# Patient Record
Sex: MALE | Race: BLACK OR AFRICAN AMERICAN | NOT HISPANIC OR LATINO | Employment: STUDENT | URBAN - METROPOLITAN AREA
[De-identification: names, ages, dates, MRNs, and addresses within clinical notes are randomized per-mention and may not be internally consistent; named-entity substitution may affect disease eponyms.]

---

## 2017-06-27 ENCOUNTER — HOSPITAL ENCOUNTER (EMERGENCY)
Facility: HOSPITAL | Age: 22
Discharge: HOME | End: 2017-06-27
Attending: EMERGENCY MEDICINE
Payer: COMMERCIAL

## 2017-06-27 VITALS
HEIGHT: 74 IN | WEIGHT: 233 LBS | SYSTOLIC BLOOD PRESSURE: 132 MMHG | DIASTOLIC BLOOD PRESSURE: 75 MMHG | HEART RATE: 98 BPM | BODY MASS INDEX: 29.9 KG/M2 | TEMPERATURE: 99 F | RESPIRATION RATE: 16 BRPM | OXYGEN SATURATION: 95 %

## 2017-06-27 DIAGNOSIS — H10.32 ACUTE CONJUNCTIVITIS OF LEFT EYE, UNSPECIFIED ACUTE CONJUNCTIVITIS TYPE: Primary | ICD-10-CM

## 2017-06-27 PROCEDURE — 99283 EMERGENCY DEPT VISIT LOW MDM: CPT

## 2017-06-27 RX ORDER — ERYTHROMYCIN 5 MG/G
OINTMENT OPHTHALMIC
Qty: 1 TUBE | Refills: 0 | Status: SHIPPED | OUTPATIENT
Start: 2017-06-27 | End: 2017-06-28 | Stop reason: ALTCHOICE

## 2017-06-27 RX ORDER — GENTAMICIN SULFATE 3 MG/ML
2 SOLUTION/ DROPS OPHTHALMIC 4 TIMES DAILY
Qty: 5 ML | Refills: 0 | Status: SHIPPED | OUTPATIENT
Start: 2017-06-27 | End: 2017-06-28 | Stop reason: ALTCHOICE

## 2017-06-27 NOTE — ED PROVIDER NOTES
Encounter Date: 6/27/2017       History     Chief Complaint   Patient presents with    Eye Problem     Patient presents to the ED with reports of having left eye redness with swelling to both the upper and lower eye lid. States symptoms started x 3 days ago.      Patient is a 21-year-old male who complains of eye redness, discharge and swelling of his eyelids of his left eye.  The symptoms started 3 days ago.  He has no visual changes.  He does not wear contact lens.  No trauma to the eye.  Patient has crusting on his eyelashes upon awakening in the morning.  He says other family members have been diagnosed with pinkeye.      The history is provided by the patient.     Review of patient's allergies indicates:  No Known Allergies  History reviewed. No pertinent past medical history.  History reviewed. No pertinent surgical history.  Family History   Problem Relation Age of Onset    Mental illness Maternal Uncle      schizophrenia    Cancer Maternal Grandmother      colon    Diabetes Maternal Grandmother      adult    Hyperlipidemia Maternal Grandmother     Hypertension Maternal Grandmother     Asthma Neg Hx     Birth defects Neg Hx     Chromosomal disorder Neg Hx     Early death Neg Hx     Heart disease Neg Hx     Seizures Neg Hx     Thyroid disease Neg Hx     Other Neg Hx      Social History   Substance Use Topics    Smoking status: Never Smoker    Smokeless tobacco: Never Used    Alcohol use No     Review of Systems   Eyes: Positive for pain, discharge and redness. Negative for photophobia and visual disturbance.   Gastrointestinal: Negative for nausea and vomiting.   Neurological: Negative for headaches.   All other systems reviewed and are negative.      Physical Exam     Initial Vitals [06/27/17 0058]   BP Pulse Resp Temp SpO2   132/75 98 16 98.7 °F (37.1 °C) 95 %      MAP       94         Physical Exam    Nursing note and vitals reviewed.  Constitutional: No distress.   HENT:   Head:  Normocephalic.   Eyes: EOM are normal. Pupils are equal, round, and reactive to light.   Severe left conjunctival injection with scant purulent discharge.  Mild edema of the upper and lower left eyelids.   Neck: Normal range of motion. Neck supple.   Cardiovascular: Normal rate, regular rhythm and normal heart sounds.   Pulmonary/Chest: Breath sounds normal.   Neurological: He is alert and oriented to person, place, and time.   Skin: Skin is warm and dry.   Psychiatric: His behavior is normal. Thought content normal.         ED Course   Procedures  Labs Reviewed - No data to display          Medical Decision Making:   ED Management:  21-year-old male with left-sided conjunctivitis.  He'll be placed on erythromycin ointment.  I have suggested he follow up with his doctor soon as able for recheck and further treatment as warranted.                   ED Course     Clinical Impression:   The encounter diagnosis was Acute conjunctivitis of left eye, unspecified acute conjunctivitis type.                           Brice Mayes MD  06/27/17 5916

## 2017-06-28 ENCOUNTER — HOSPITAL ENCOUNTER (EMERGENCY)
Facility: HOSPITAL | Age: 22
Discharge: HOME | End: 2017-06-28
Attending: EMERGENCY MEDICINE
Payer: COMMERCIAL

## 2017-06-28 VITALS
BODY MASS INDEX: 29.9 KG/M2 | WEIGHT: 233 LBS | SYSTOLIC BLOOD PRESSURE: 123 MMHG | HEIGHT: 74 IN | RESPIRATION RATE: 18 BRPM | DIASTOLIC BLOOD PRESSURE: 52 MMHG | OXYGEN SATURATION: 98 % | HEART RATE: 86 BPM | TEMPERATURE: 98 F

## 2017-06-28 DIAGNOSIS — H00.036 CELLULITIS OF EYELID, LEFT: Primary | ICD-10-CM

## 2017-06-28 DIAGNOSIS — H10.503 BLEPHAROCONJUNCTIVITIS OF BOTH EYES, UNSPECIFIED BLEPHAROCONJUNCTIVITIS TYPE: ICD-10-CM

## 2017-06-28 LAB
ALBUMIN SERPL BCP-MCNC: 4.2 G/DL
ALP SERPL-CCNC: 52 U/L
ALT SERPL W/O P-5'-P-CCNC: 24 U/L
ANION GAP SERPL CALC-SCNC: 8 MMOL/L
AST SERPL-CCNC: 22 U/L
BASOPHILS # BLD AUTO: 0.01 K/UL
BASOPHILS NFR BLD: 0.3 %
BILIRUB SERPL-MCNC: 0.6 MG/DL
BUN SERPL-MCNC: 12 MG/DL
CALCIUM SERPL-MCNC: 9.6 MG/DL
CHLORIDE SERPL-SCNC: 106 MMOL/L
CO2 SERPL-SCNC: 27 MMOL/L
CREAT SERPL-MCNC: 1.1 MG/DL
DIFFERENTIAL METHOD: ABNORMAL
EOSINOPHIL # BLD AUTO: 0 K/UL
EOSINOPHIL NFR BLD: 0.9 %
ERYTHROCYTE [DISTWIDTH] IN BLOOD BY AUTOMATED COUNT: 12.1 %
EST. GFR  (AFRICAN AMERICAN): >60 ML/MIN/1.73 M^2
EST. GFR  (NON AFRICAN AMERICAN): >60 ML/MIN/1.73 M^2
GLUCOSE SERPL-MCNC: 86 MG/DL
HCT VFR BLD AUTO: 48.3 %
HGB BLD-MCNC: 16 G/DL
LYMPHOCYTES # BLD AUTO: 1.5 K/UL
LYMPHOCYTES NFR BLD: 42.3 %
MCH RBC QN AUTO: 29 PG
MCHC RBC AUTO-ENTMCNC: 33.1 %
MCV RBC AUTO: 88 FL
MONOCYTES # BLD AUTO: 0.6 K/UL
MONOCYTES NFR BLD: 18.3 %
NEUTROPHILS # BLD AUTO: 1.3 K/UL
NEUTROPHILS NFR BLD: 37.9 %
PLATELET # BLD AUTO: 262 K/UL
PMV BLD AUTO: 8.7 FL
POTASSIUM SERPL-SCNC: 4.2 MMOL/L
PROT SERPL-MCNC: 7.9 G/DL
RBC # BLD AUTO: 5.51 M/UL
SODIUM SERPL-SCNC: 141 MMOL/L
WBC # BLD AUTO: 3.45 K/UL

## 2017-06-28 PROCEDURE — 80053 COMPREHEN METABOLIC PANEL: CPT

## 2017-06-28 PROCEDURE — 85025 COMPLETE CBC W/AUTO DIFF WBC: CPT

## 2017-06-28 PROCEDURE — 25000003 PHARM REV CODE 250: Performed by: EMERGENCY MEDICINE

## 2017-06-28 PROCEDURE — 96365 THER/PROPH/DIAG IV INF INIT: CPT

## 2017-06-28 PROCEDURE — 99283 EMERGENCY DEPT VISIT LOW MDM: CPT | Mod: 25

## 2017-06-28 RX ORDER — CLINDAMYCIN HYDROCHLORIDE 150 MG/1
300 CAPSULE ORAL 4 TIMES DAILY
Qty: 56 CAPSULE | Refills: 0 | Status: SHIPPED | OUTPATIENT
Start: 2017-06-28 | End: 2017-07-05

## 2017-06-28 RX ORDER — CLINDAMYCIN PHOSPHATE 900 MG/50ML
900 INJECTION, SOLUTION INTRAVENOUS
Status: COMPLETED | OUTPATIENT
Start: 2017-06-28 | End: 2017-06-28

## 2017-06-28 RX ADMIN — CLINDAMYCIN IN 5 PERCENT DEXTROSE 900 MG: 18 INJECTION, SOLUTION INTRAVENOUS at 03:06

## 2017-06-28 RX ADMIN — CIPROFLOXACIN HYDROCHLORIDE: 3 OINTMENT OPHTHALMIC at 06:06

## 2017-06-28 NOTE — ED NOTES
Patient has verified the spelling of their name and  on armband  LOC: The patient is awake, alert, and aware of environment with an appropriate affect, the patient is oriented x 4 and speaking appropriately.   APPEARANCE: Patient resting comfortably and in no acute distress, patient is clean and well groomed, patient's clothing is properly fastened.   SKIN: The skin is warm and dry, color consistent with ethnicity, patient has normal skin turgor and moist mucus membranes, skin intact, no breakdown or bruising noted, pt states he had pink eye on Saturday and was sent home with medicine and he has been using medicine but it has not gotten better, pt left eye watery, pink, and burning, pt right eye watery at this time.   : Voids without difficulty  MUSCULOSKELETAL: Patient moving all extremities spontaneously, no obvious swelling or deformities noted.   RESPIRATORY: Airway is open and patent, respirations are spontaneous, patient has a normal effort and rate, no accessory muscle use noted, bilateral breath sounds clear, denies SOB   ABDOMEN: Soft and non tender to palpation, no distention noted, normoactive bowel sounds present in all four quadrants.   CARDIAC: Normal rate and rhythm, no peripheral edema noted, less then 3 second capillary refill, denies chest pain

## 2017-06-28 NOTE — ED PROVIDER NOTES
Encounter Date: 6/28/2017       History     Chief Complaint   Patient presents with    Belepharitis     L upper eyelid swelling, states came to this ED on tuesday for conjunctivits to L eye and has been taking meds as prescribed.  Swelling remains     The patient presents the emergency department with left eye redness and swelling.  The patient was here 2 days ago with a diagnosis of conjunctivitis, he was given erythromycin ointment at that time.  He states that his eyes are not any better and in fact the left upper eyelid is now swollen and painful.  He denies any fever, nausea vomiting, diarrhea.          Review of patient's allergies indicates:  No Known Allergies  History reviewed. No pertinent past medical history.  History reviewed. No pertinent surgical history.  Family History   Problem Relation Age of Onset    Mental illness Maternal Uncle      schizophrenia    Cancer Maternal Grandmother      colon    Diabetes Maternal Grandmother      adult    Hyperlipidemia Maternal Grandmother     Hypertension Maternal Grandmother     Asthma Neg Hx     Birth defects Neg Hx     Chromosomal disorder Neg Hx     Early death Neg Hx     Heart disease Neg Hx     Seizures Neg Hx     Thyroid disease Neg Hx     Other Neg Hx      Social History   Substance Use Topics    Smoking status: Never Smoker    Smokeless tobacco: Never Used    Alcohol use No     Review of Systems   Constitutional: Negative for fever.   HENT: Negative for sore throat.    Eyes: Positive for pain, discharge, redness and itching. Negative for photophobia and visual disturbance.   Respiratory: Negative for shortness of breath.    Cardiovascular: Negative for chest pain.   Gastrointestinal: Negative for nausea.   Genitourinary: Negative for dysuria.   Musculoskeletal: Negative for back pain.   Skin: Negative for rash.   Neurological: Negative for weakness.   Hematological: Does not bruise/bleed easily.       Physical Exam     Initial Vitals  [06/28/17 1350]   BP Pulse Resp Temp SpO2   (!) 153/71 80 18 98.3 °F (36.8 °C) 97 %      MAP       98.33         Physical Exam    Nursing note and vitals reviewed.  Constitutional: He appears well-developed and well-nourished.   HENT:   Head: Normocephalic and atraumatic.   Nose: Nose normal.   Mouth/Throat: Oropharynx is clear and moist.   Eyes: EOM are normal. Pupils are equal, round, and reactive to light. Right eye exhibits discharge. Left eye exhibits discharge.   Right conjunctiva is erythematous.  Left conjunctiva is erythematous and the left upper lid is edematous and erythematous.         ED Course   Procedures  Labs Reviewed   CBC W/ AUTO DIFFERENTIAL - Abnormal; Notable for the following:        Result Value    WBC 3.45 (*)     MPV 8.7 (*)     Gran # 1.3 (*)     Gran% 37.9 (*)     Mono% 18.3 (*)     All other components within normal limits   COMPREHENSIVE METABOLIC PANEL - Abnormal; Notable for the following:     Alkaline Phosphatase 52 (*)     All other components within normal limits             Medical Decision Making:   Clinical Tests:   Lab Tests: Ordered and Reviewed  The following lab test(s) were unremarkable: CBC and CMP  ED Management:  The patient was given erythromycin ointment for bilateral conjunctivitis and his conjunctivitis is unresolved after 2 days and in fact is worse in the left eye and now involves the upper eyelid.  The patient was given clindamycin  mg for possible cellulitis to the upper eyelid, there is no orbital bulging or pain with IV movement.  The patient will be changed to ciloxin ointment.                   ED Course     Clinical Impression:   The primary encounter diagnosis was Cellulitis of eyelid, left. A diagnosis of Blepharoconjunctivitis of both eyes, unspecified blepharoconjunctivitis type was also pertinent to this visit.                           Aline Wesley MD  06/28/17 8508

## 2017-06-28 NOTE — ED TRIAGE NOTES
Pt arrived to hosp with complaints of pink eye to left eye. Left eye watery drainage and pink. Right eye watery

## 2018-01-29 ENCOUNTER — OFFICE VISIT (OUTPATIENT)
Dept: INTERNAL MEDICINE | Facility: CLINIC | Age: 23
End: 2018-01-29
Payer: COMMERCIAL

## 2018-01-29 VITALS
BODY MASS INDEX: 30.87 KG/M2 | HEIGHT: 74 IN | TEMPERATURE: 99 F | HEART RATE: 102 BPM | DIASTOLIC BLOOD PRESSURE: 64 MMHG | WEIGHT: 240.5 LBS | SYSTOLIC BLOOD PRESSURE: 102 MMHG | OXYGEN SATURATION: 97 %

## 2018-01-29 DIAGNOSIS — L30.4 CHAFING: Primary | ICD-10-CM

## 2018-01-29 DIAGNOSIS — Z00.00 ROUTINE HEALTH MAINTENANCE: ICD-10-CM

## 2018-01-29 PROCEDURE — 99385 PREV VISIT NEW AGE 18-39: CPT | Mod: S$GLB,,, | Performed by: INTERNAL MEDICINE

## 2018-01-29 PROCEDURE — 99999 PR PBB SHADOW E&M-EST. PATIENT-LVL III: CPT | Mod: PBBFAC,,, | Performed by: INTERNAL MEDICINE

## 2018-01-29 NOTE — PROGRESS NOTES
Subjective:       Patient ID: Dmarcus D Renny is a 22 y.o. male.    Chief Complaint: Rash (groin area x 1 month)    HPI Mr. Lawson is a 22-year-old male who presents with a chief complaint of rash.  Rash is located in the area of his groin bilaterally.  Rashes been present for one month.  It is worsening over the last 1 month.  He describes the rash as an area where the skin has rubbed off, and as red skin.  He says that the rash doesn't bother him much at all.  He describes it as slightly itchy but not painful.  It is somewhat tender.  He thinks he may feel a rubbing of his thighs when walking.  He does admit that he has recently gained weight after stopping exercise at the gym.  He has not tried any creams or lotions for relief.  Nothing makes the rash better or worse.  He denies any sports play.    Review of Systems   Skin: Positive for rash.       Objective:      Physical Exam   Constitutional: He is oriented to person, place, and time. He appears well-developed and well-nourished. No distress.   HENT:   Head: Normocephalic and atraumatic.   Cardiovascular: Normal rate, regular rhythm, normal heart sounds and intact distal pulses.  Exam reveals no gallop and no friction rub.    No murmur heard.  Pulmonary/Chest: Effort normal and breath sounds normal. No respiratory distress. He has no wheezes. He has no rales.   Neurological: He is alert and oriented to person, place, and time.   Skin: Skin is warm and dry. Rash noted. He is not diaphoretic.   Area of erythematous skin approximately 0.5 cm X 0.5 cm in size located in the medial aspect of left thigh visualized. There is no hair in this area of erythematous skin. No skin breakdown. No signs of infection.   Psychiatric: He has a normal mood and affect. His behavior is normal. Judgment and thought content normal.   Vitals reviewed.      Assessment:       1. Chafing    2. Routine health maintenance        Plan:     #1 chafing  Area of erythematous skin consistent  with chafing of the skin.  Advised patient to wear loosefitting clothing and to cover the area with a bandaged until it heals. Encouraged him to go to gym for weight loss.  Discussed signs of skin breakdown and infection such as warmth or swelling or purulent drainage.  Patient is aware that he should return for any of the signs of infection being present.    RTC one month to establish care (labs ordered for that appt)

## 2018-07-19 ENCOUNTER — HOSPITAL ENCOUNTER (EMERGENCY)
Facility: HOSPITAL | Age: 23
Discharge: HOME | End: 2018-07-19
Attending: EMERGENCY MEDICINE
Payer: COMMERCIAL

## 2018-07-19 VITALS
HEART RATE: 87 BPM | WEIGHT: 230 LBS | OXYGEN SATURATION: 98 % | BODY MASS INDEX: 29.52 KG/M2 | TEMPERATURE: 98 F | DIASTOLIC BLOOD PRESSURE: 79 MMHG | SYSTOLIC BLOOD PRESSURE: 132 MMHG | RESPIRATION RATE: 18 BRPM | HEIGHT: 74 IN

## 2018-07-19 DIAGNOSIS — S13.4XXA WHIPLASH INJURY TO NECK, INITIAL ENCOUNTER: Primary | ICD-10-CM

## 2018-07-19 PROCEDURE — 99283 EMERGENCY DEPT VISIT LOW MDM: CPT

## 2018-07-19 RX ORDER — METHOCARBAMOL 500 MG/1
1000 TABLET, FILM COATED ORAL 3 TIMES DAILY
Qty: 30 TABLET | Refills: 0 | Status: SHIPPED | OUTPATIENT
Start: 2018-07-19 | End: 2018-07-24

## 2018-07-19 RX ORDER — NAPROXEN 500 MG/1
500 TABLET ORAL 2 TIMES DAILY WITH MEALS
Qty: 30 TABLET | Refills: 0 | Status: SHIPPED | OUTPATIENT
Start: 2018-07-19 | End: 2019-03-08
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
## 2018-07-24 ENCOUNTER — OFFICE VISIT (OUTPATIENT)
Dept: INTERNAL MEDICINE | Facility: CLINIC | Age: 23
End: 2018-07-24
Payer: COMMERCIAL

## 2018-07-24 VITALS
HEART RATE: 107 BPM | DIASTOLIC BLOOD PRESSURE: 60 MMHG | BODY MASS INDEX: 30.75 KG/M2 | WEIGHT: 239.63 LBS | OXYGEN SATURATION: 97 % | SYSTOLIC BLOOD PRESSURE: 118 MMHG | HEIGHT: 74 IN

## 2018-07-24 DIAGNOSIS — M54.6 ACUTE RIGHT-SIDED THORACIC BACK PAIN: Primary | ICD-10-CM

## 2018-07-24 DIAGNOSIS — R10.9 ABDOMINAL PAIN, UNSPECIFIED ABDOMINAL LOCATION: ICD-10-CM

## 2018-07-24 PROCEDURE — 99999 PR PBB SHADOW E&M-EST. PATIENT-LVL III: CPT | Mod: PBBFAC,,, | Performed by: INTERNAL MEDICINE

## 2018-07-24 PROCEDURE — 3008F BODY MASS INDEX DOCD: CPT | Mod: CPTII,S$GLB,, | Performed by: INTERNAL MEDICINE

## 2018-07-24 PROCEDURE — 99213 OFFICE O/P EST LOW 20 MIN: CPT | Mod: S$GLB,,, | Performed by: INTERNAL MEDICINE
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
## 2018-07-24 NOTE — PATIENT INSTRUCTIONS
Naproxen and naproxen sodium oral immediate-release tablets  What is this medicine?  NAPROXEN (na PROX en) is a non-steroidal anti-inflammatory drug (NSAID). It is used to reduce swelling and to treat pain. This medicine may be used for dental pain, headache, or painful monthly periods. It is also used for painful joint and muscular problems such as arthritis, tendinitis, bursitis, and gout.  How should I use this medicine?  Take this medicine by mouth with a glass of water. Follow the directions on the prescription label. Take it with food if your stomach gets upset. Try to not lie down for at least 10 minutes after you take it. Take your medicine at regular intervals. Do not take your medicine more often than directed. Long-term, continuous use may increase the risk of heart attack or stroke.  A special MedGuide will be given to you by the pharmacist with each prescription and refill. Be sure to read this information carefully each time.  Talk to your pediatrician regarding the use of this medicine in children. Special care may be needed.  What side effects may I notice from receiving this medicine?  Side effects that you should report to your doctor or health care professional as soon as possible:  · black or bloody stools, blood in the urine or vomit  · blurred vision  · chest pain  · difficulty breathing or wheezing  · nausea or vomiting  · severe stomach pain  · skin rash, skin redness, blistering or peeling skin, hives, or itching  · slurred speech or weakness on one side of the body  · swelling of eyelids, throat, lips  · unexplained weight gain or swelling  · unusually weak or tired  · yellowing of eyes or skin  Side effects that usually do not require medical attention (report to your doctor or health care professional if they continue or are bothersome):  · constipation  · headache  · heartburn  What may interact with this  medicine?  · alcohol  · aspirin  · cidofovir  · diuretics  · lithium  · methotrexate  · other drugs for inflammation like ketorolac or prednisone  · pemetrexed  · probenecid  · warfarin  What if I miss a dose?  If you miss a dose, take it as soon as you can. If it is almost time for your next dose, take only that dose. Do not take double or extra doses.  Where should I keep my medicine?  Keep out of the reach of children.  Store at room temperature between 15 and 30 degrees C (59 and 86 degrees F). Keep container tightly closed. Throw away any unused medicine after the expiration date.  What should I tell my health care provider before I take this medicine?  They need to know if you have any of these conditions:  · asthma  · cigarette smoker  · drink more than 3 alcohol containing drinks a day  · heart disease or circulation problems such as heart failure or leg edema (fluid retention)  · high blood pressure  · kidney disease  · liver disease  · stomach bleeding or ulcers  · an unusual or allergic reaction to naproxen, aspirin, other NSAIDs, other medicines, foods, dyes, or preservatives  · pregnant or trying to get pregnant  · breast-feeding  What should I watch for while using this medicine?  Tell your doctor or health care professional if your pain does not get better. Talk to your doctor before taking another medicine for pain. Do not treat yourself.  This medicine does not prevent heart attack or stroke. In fact, this medicine may increase the chance of a heart attack or stroke. The chance may increase with longer use of this medicine and in people who have heart disease. If you take aspirin to prevent heart attack or stroke, talk with your doctor or health care professional.  Do not take other medicines that contain aspirin, ibuprofen, or naproxen with this medicine. Side effects such as stomach upset, nausea, or ulcers may be more likely to occur. Many medicines available without a prescription should not be  taken with this medicine.  This medicine can cause ulcers and bleeding in the stomach and intestines at any time during treatment. Do not smoke cigarettes or drink alcohol. These increase irritation to your stomach and can make it more susceptible to damage from this medicine. Ulcers and bleeding can happen without warning symptoms and can cause death.  You may get drowsy or dizzy. Do not drive, use machinery, or do anything that needs mental alertness until you know how this medicine affects you. Do not stand or sit up quickly, especially if you are an older patient. This reduces the risk of dizzy or fainting spells.  This medicine can cause you to bleed more easily. Try to avoid damage to your teeth and gums when you brush or floss your teeth.  NOTE:This sheet is a summary. It may not cover all possible information. If you have questions about this medicine, talk to your doctor, pharmacist, or health care provider. Copyright© 2017 [employer]

## 2019-03-08 ENCOUNTER — OFFICE VISIT (OUTPATIENT)
Dept: INTERNAL MEDICINE | Facility: CLINIC | Age: 24
End: 2019-03-08
Payer: COMMERCIAL

## 2019-03-08 VITALS
TEMPERATURE: 99 F | WEIGHT: 224.88 LBS | OXYGEN SATURATION: 96 % | HEIGHT: 74 IN | SYSTOLIC BLOOD PRESSURE: 120 MMHG | DIASTOLIC BLOOD PRESSURE: 76 MMHG | BODY MASS INDEX: 28.86 KG/M2 | HEART RATE: 84 BPM

## 2019-03-08 DIAGNOSIS — H65.01 RIGHT ACUTE SEROUS OTITIS MEDIA, RECURRENCE NOT SPECIFIED: ICD-10-CM

## 2019-03-08 DIAGNOSIS — H61.23 BILATERAL IMPACTED CERUMEN: Primary | ICD-10-CM

## 2019-03-08 PROCEDURE — 3008F BODY MASS INDEX DOCD: CPT | Mod: CPTII,S$GLB,, | Performed by: INTERNAL MEDICINE

## 2019-03-08 PROCEDURE — 99999 PR PBB SHADOW E&M-EST. PATIENT-LVL IV: ICD-10-PCS | Mod: PBBFAC,,, | Performed by: INTERNAL MEDICINE

## 2019-03-08 PROCEDURE — 99999 PR PBB SHADOW E&M-EST. PATIENT-LVL IV: CPT | Mod: PBBFAC,,, | Performed by: INTERNAL MEDICINE

## 2019-03-08 PROCEDURE — 99214 PR OFFICE/OUTPT VISIT, EST, LEVL IV, 30-39 MIN: ICD-10-PCS | Mod: S$GLB,,, | Performed by: INTERNAL MEDICINE

## 2019-03-08 PROCEDURE — 3008F PR BODY MASS INDEX (BMI) DOCUMENTED: ICD-10-PCS | Mod: CPTII,S$GLB,, | Performed by: INTERNAL MEDICINE

## 2019-03-08 PROCEDURE — 99214 OFFICE O/P EST MOD 30 MIN: CPT | Mod: S$GLB,,, | Performed by: INTERNAL MEDICINE

## 2019-03-08 RX ORDER — FLUTICASONE PROPIONATE 50 MCG
2 SPRAY, SUSPENSION (ML) NASAL DAILY
Qty: 16 G | Refills: 0 | Status: SHIPPED | OUTPATIENT
Start: 2019-03-08 | End: 2019-04-07

## 2019-03-08 NOTE — PROGRESS NOTES
Subjective:       Patient ID: Dmarcus D Renny is a 23 y.o. male.    Chief Complaint: Otalgia (R>L x 2-3 weeks)    HPI Mr. Lawson is a 23 year old male who presents with a chief complaint of ear pain.  Onset was 2-3 weeks ago.  Patient felt like he was getting sick.  He took some NyQuil.  Then he began to have hearing problems.  His symptoms went away but then returned.  They are now getting worse.  He has decreased hearing in the right ear, associated with a feeling of tightness in that ear.  Now he feels like it is moving to the left ear as well.  He also reports an associated ringing in the right ear.  He denies any associated fever, sinus pressure/pain, or sore throat.  Symptom is constant.    Review of Systems   Constitutional: Negative for fever.   HENT: Positive for ear discharge, hearing loss and tinnitus. Negative for sinus pressure, sinus pain and sore throat.        Objective:      Physical Exam   Constitutional: He is oriented to person, place, and time. He appears well-developed and well-nourished. No distress.   HENT:   Head: Normocephalic and atraumatic.   Right Ear: External ear and ear canal normal.   Left Ear: Tympanic membrane, external ear and ear canal normal.   Small amount of fluid behind right-sided TM.   Eyes: Conjunctivae and EOM are normal.   Cardiovascular: Normal rate, regular rhythm, normal heart sounds and intact distal pulses. Exam reveals no gallop and no friction rub.   No murmur heard.  Pulmonary/Chest: Effort normal and breath sounds normal. No stridor. No respiratory distress. He has no wheezes. He has no rales.   Neurological: He is alert and oriented to person, place, and time.   Skin: Skin is warm and dry. He is not diaphoretic.   Psychiatric: He has a normal mood and affect. His behavior is normal. Judgment and thought content normal.   Vitals reviewed.      Assessment:       1. Bilateral impacted cerumen    2. Right acute serous otitis media, recurrence not specified         Plan:     1.  Bilateral impacted cerumen  Ear wash performed in clinic and cerumen disimpacted    2.  Right acute serous otitis media  Flonase, 2 sprays in each nostril daily    Patient should return to clinic for any worsening symptoms such as worsening ear pain, fever, or if symptoms persist after 1 week of Flonase use. Patient understands    RTC PRN

## 2020-03-04 ENCOUNTER — HOSPITAL ENCOUNTER (EMERGENCY)
Facility: HOSPITAL | Age: 25
Discharge: PSYCHIATRIC OR SUBSTANCE USE FACILITY | End: 2020-03-05
Attending: EMERGENCY MEDICINE
Payer: COMMERCIAL

## 2020-03-04 DIAGNOSIS — Z00.8 MEDICAL CLEARANCE FOR PSYCHIATRIC ADMISSION: ICD-10-CM

## 2020-03-04 DIAGNOSIS — F23 ACUTE PSYCHOSIS: Primary | ICD-10-CM

## 2020-03-04 LAB
BACTERIA #/AREA URNS AUTO: NORMAL /HPF
BASOPHILS # BLD AUTO: 0.03 K/UL (ref 0–0.2)
BASOPHILS NFR BLD: 0.4 % (ref 0–1.9)
BILIRUB UR QL STRIP: NEGATIVE
CLARITY UR REFRACT.AUTO: CLEAR
COLOR UR AUTO: YELLOW
DIFFERENTIAL METHOD: ABNORMAL
EOSINOPHIL # BLD AUTO: 0.1 K/UL (ref 0–0.5)
EOSINOPHIL NFR BLD: 1.2 % (ref 0–8)
ERYTHROCYTE [DISTWIDTH] IN BLOOD BY AUTOMATED COUNT: 11.9 % (ref 11.5–14.5)
GLUCOSE UR QL STRIP: NEGATIVE
HCT VFR BLD AUTO: 53.1 % (ref 40–54)
HGB BLD-MCNC: 16.3 G/DL (ref 14–18)
HGB UR QL STRIP: NEGATIVE
IMM GRANULOCYTES # BLD AUTO: 0.01 K/UL (ref 0–0.04)
IMM GRANULOCYTES NFR BLD AUTO: 0.1 % (ref 0–0.5)
KETONES UR QL STRIP: ABNORMAL
LEUKOCYTE ESTERASE UR QL STRIP: NEGATIVE
LYMPHOCYTES # BLD AUTO: 2.4 K/UL (ref 1–4.8)
LYMPHOCYTES NFR BLD: 32.2 % (ref 18–48)
MCH RBC QN AUTO: 28.6 PG (ref 27–31)
MCHC RBC AUTO-ENTMCNC: 30.7 G/DL (ref 32–36)
MCV RBC AUTO: 93 FL (ref 82–98)
MICROSCOPIC COMMENT: NORMAL
MONOCYTES # BLD AUTO: 0.8 K/UL (ref 0.3–1)
MONOCYTES NFR BLD: 11.5 % (ref 4–15)
NEUTROPHILS # BLD AUTO: 4 K/UL (ref 1.8–7.7)
NEUTROPHILS NFR BLD: 54.6 % (ref 38–73)
NITRITE UR QL STRIP: NEGATIVE
NRBC BLD-RTO: 0 /100 WBC
PH UR STRIP: 6 [PH] (ref 5–8)
PLATELET # BLD AUTO: 393 K/UL (ref 150–350)
PMV BLD AUTO: 8.7 FL (ref 9.2–12.9)
PROT UR QL STRIP: NEGATIVE
RBC # BLD AUTO: 5.69 M/UL (ref 4.6–6.2)
RBC #/AREA URNS AUTO: 0 /HPF (ref 0–4)
SP GR UR STRIP: 1.02 (ref 1–1.03)
URN SPEC COLLECT METH UR: ABNORMAL
WBC # BLD AUTO: 7.3 K/UL (ref 3.9–12.7)
WBC #/AREA URNS AUTO: 1 /HPF (ref 0–5)

## 2020-03-04 PROCEDURE — 99285 PR EMERGENCY DEPT VISIT,LEVEL V: ICD-10-PCS | Mod: ,,, | Performed by: EMERGENCY MEDICINE

## 2020-03-04 PROCEDURE — 80320 DRUG SCREEN QUANTALCOHOLS: CPT

## 2020-03-04 PROCEDURE — 80307 DRUG TEST PRSMV CHEM ANLYZR: CPT

## 2020-03-04 PROCEDURE — 85025 COMPLETE CBC W/AUTO DIFF WBC: CPT

## 2020-03-04 PROCEDURE — 80053 COMPREHEN METABOLIC PANEL: CPT

## 2020-03-04 PROCEDURE — 81001 URINALYSIS AUTO W/SCOPE: CPT

## 2020-03-04 PROCEDURE — 93010 ELECTROCARDIOGRAM REPORT: CPT | Mod: ,,, | Performed by: INTERNAL MEDICINE

## 2020-03-04 PROCEDURE — 93005 ELECTROCARDIOGRAM TRACING: CPT

## 2020-03-04 PROCEDURE — 80329 ANALGESICS NON-OPIOID 1 OR 2: CPT

## 2020-03-04 PROCEDURE — 93010 EKG 12-LEAD: ICD-10-PCS | Mod: ,,, | Performed by: INTERNAL MEDICINE

## 2020-03-04 PROCEDURE — 84443 ASSAY THYROID STIM HORMONE: CPT

## 2020-03-04 PROCEDURE — 99285 EMERGENCY DEPT VISIT HI MDM: CPT | Mod: ,,, | Performed by: EMERGENCY MEDICINE

## 2020-03-05 VITALS
TEMPERATURE: 98 F | RESPIRATION RATE: 18 BRPM | HEART RATE: 89 BPM | BODY MASS INDEX: 29.52 KG/M2 | DIASTOLIC BLOOD PRESSURE: 79 MMHG | OXYGEN SATURATION: 99 % | SYSTOLIC BLOOD PRESSURE: 140 MMHG | HEIGHT: 74 IN | WEIGHT: 230 LBS

## 2020-03-05 LAB
ALBUMIN SERPL BCP-MCNC: 4.6 G/DL (ref 3.5–5.2)
ALP SERPL-CCNC: 55 U/L (ref 55–135)
ALT SERPL W/O P-5'-P-CCNC: 31 U/L (ref 10–44)
AMPHET+METHAMPHET UR QL: NEGATIVE
ANION GAP SERPL CALC-SCNC: 10 MMOL/L (ref 8–16)
APAP SERPL-MCNC: <3 UG/ML (ref 10–20)
AST SERPL-CCNC: 25 U/L (ref 10–40)
BARBITURATES UR QL SCN>200 NG/ML: NEGATIVE
BENZODIAZ UR QL SCN>200 NG/ML: NEGATIVE
BILIRUB SERPL-MCNC: 0.5 MG/DL (ref 0.1–1)
BUN SERPL-MCNC: 13 MG/DL (ref 6–20)
BZE UR QL SCN: NEGATIVE
CALCIUM SERPL-MCNC: 10 MG/DL (ref 8.7–10.5)
CANNABINOIDS UR QL SCN: NEGATIVE
CHLORIDE SERPL-SCNC: 104 MMOL/L (ref 95–110)
CO2 SERPL-SCNC: 28 MMOL/L (ref 23–29)
CREAT SERPL-MCNC: 1.1 MG/DL (ref 0.5–1.4)
CREAT UR-MCNC: 356 MG/DL (ref 23–375)
EST. GFR  (AFRICAN AMERICAN): >60 ML/MIN/1.73 M^2
EST. GFR  (NON AFRICAN AMERICAN): >60 ML/MIN/1.73 M^2
ETHANOL SERPL-MCNC: <10 MG/DL
GLUCOSE SERPL-MCNC: 86 MG/DL (ref 70–110)
METHADONE UR QL SCN>300 NG/ML: NEGATIVE
OPIATES UR QL SCN: NEGATIVE
PCP UR QL SCN>25 NG/ML: NEGATIVE
POTASSIUM SERPL-SCNC: 4.3 MMOL/L (ref 3.5–5.1)
PROT SERPL-MCNC: 8.3 G/DL (ref 6–8.4)
SODIUM SERPL-SCNC: 142 MMOL/L (ref 136–145)
TOXICOLOGY INFORMATION: NORMAL
TSH SERPL DL<=0.005 MIU/L-ACNC: 1.41 UIU/ML (ref 0.4–4)

## 2020-03-05 PROCEDURE — 99285 EMERGENCY DEPT VISIT HI MDM: CPT

## 2020-03-05 NOTE — ED TRIAGE NOTES
"Patient is seeking help with "evil spirits" that are overwhelming him. 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 "I don't want to hurt anyone when I'm in a rage." Denies SI, HI. Denies drug use. Patient is currently calm and cooperative, holding a Bible and reciting scripture versus.   "

## 2020-03-05 NOTE — ED NOTES
Appears asleep w/ eyes closed, rise/fall of chest noted. [place] in bed, not eaten, bible open. Maintained on direct visual observation.

## 2020-03-05 NOTE — ED NOTES
"Appeared asleep w/ eyes closed, rise/fall of chest noted, awake briefly & informed that nurse just making rounds, "yes maam'", returned to resting position. Maintained on direct visual observation. Awaiting placement.  "

## 2020-03-05 NOTE — ED PROVIDER NOTES
Encounter Date: 3/4/2020       History     Chief Complaint   Patient presents with    Psychiatric Evaluation     hearing voices and they are violent     24-year-old male to the ER for evaluation of hearing voices.  The patient reports being addicted to pornography.  Today he began having thoughts of hurting other people.  He wants to inflict violence upon people but does not know who.  He has never had these thoughts before.  He is awake alert and oriented.  The patient also endorses being very Spiritism.  He is carrying a Bible.  The patient is very calm and cooperative.  He does have a very flat affect and odd demeanor.  He denies any history of psych use.  He denies any recent drug or alcohol use.  He does not appear to have any bodily trauma or injury.  He denies any suicidal ideations but does endorse wanting to inflict harm on other people.        Review of patient's allergies indicates:  No Known Allergies  No past medical history on file.  No past surgical history on file.  Family History   Problem Relation Age of Onset    Mental illness Maternal Uncle         schizophrenia    Cancer Maternal Grandmother         colon    Diabetes Maternal Grandmother         adult    Hyperlipidemia Maternal Grandmother     Hypertension Maternal Grandmother     No Known Problems Mother     Hypertension Father     Asthma Neg Hx     Birth defects Neg Hx     Chromosomal disorder Neg Hx     Early death Neg Hx     Heart disease Neg Hx     Seizures Neg Hx     Thyroid disease Neg Hx     Other Neg Hx      Social History     Tobacco Use    Smoking status: Never Smoker    Smokeless tobacco: Never Used   Substance Use Topics    Alcohol use: No    Drug use: No     Review of Systems   Constitutional: Negative for fever.   HENT: Negative for sore throat.    Respiratory: Negative for shortness of breath.    Cardiovascular: Negative for chest pain.   Gastrointestinal: Negative for nausea.   Genitourinary: Negative for  dysuria.   Musculoskeletal: Negative for back pain.   Skin: Negative for rash.   Neurological: Negative for weakness.   Hematological: Does not bruise/bleed easily.   Psychiatric/Behavioral: Positive for behavioral problems and sleep disturbance. Negative for hallucinations, self-injury and suicidal ideas. The patient is not nervous/anxious and is not hyperactive.        Physical Exam     Initial Vitals [03/04/20 1848]   BP Pulse Resp Temp SpO2   (!) 141/75 87 18 98.6 °F (37 °C) 97 %      MAP       --         Physical Exam    Constitutional: Vital signs are normal. He appears well-developed and well-nourished. He is not diaphoretic. No distress.   HENT:   Head: Normocephalic and atraumatic.   Right Ear: Hearing and external ear normal.   Left Ear: Hearing and external ear normal.   Eyes: Conjunctivae are normal.   Cardiovascular: Normal rate and regular rhythm. Exam reveals no gallop and no friction rub.    No murmur heard.  Abdominal: Soft. Normal appearance and bowel sounds are normal.   Musculoskeletal: Normal range of motion.   Neurological: He is alert and oriented to person, place, and time.   Skin: Skin is warm and intact.   Psychiatric: His speech is normal. His affect is blunt. Thought content is paranoid.   Flat affect         ED Course   Procedures  Labs Reviewed   CBC W/ AUTO DIFFERENTIAL   COMPREHENSIVE METABOLIC PANEL   TSH   URINALYSIS, REFLEX TO URINE CULTURE   DRUG SCREEN PANEL, URINE EMERGENCY   ALCOHOL,MEDICAL (ETHANOL)   ACETAMINOPHEN LEVEL          Imaging Results    None          Medical Decision Making:   History:   Old Medical Records: I decided to obtain old medical records.  Initial Assessment:   23 yo M presenting with thoughts of inflicting harm on other people. He believes this is a result of watching porn. He does not want to have these thoughts and came to the ED to get help. He is calm and cooperative.   Differential Diagnosis:   Acute psychosis, drug induced mood disorder, ethanol  induced mood disorder, Schizophrenia, bipolar disorder  Clinical Tests:   Lab Tests: Ordered and Reviewed  Medical Tests: Ordered and Reviewed  ED Management:  24-year-old male with acute psychotic episode.  No past medical history of mental health disorder.  Patient is medically cleared for mental health placement.  We do not have any in patient meds at this facility.  The patient was placed under a PEC and will be transferred out.                                 Clinical Impression:       ICD-10-CM ICD-9-CM   1. Acute psychosis F23 298.9   2. Medical clearance for psychiatric admission Z00.8 V70.8         Disposition:   Disposition: Transferred  Condition: Stable                        Vicente Lindsey PA-C  03/05/20 0100

## 2020-03-17 ENCOUNTER — OFFICE VISIT (OUTPATIENT)
Dept: PSYCHIATRY | Facility: CLINIC | Age: 25
End: 2020-03-17
Payer: COMMERCIAL

## 2020-03-17 VITALS — HEART RATE: 70 BPM | DIASTOLIC BLOOD PRESSURE: 80 MMHG | SYSTOLIC BLOOD PRESSURE: 105 MMHG

## 2020-03-17 DIAGNOSIS — F23 BRIEF PSYCHOTIC DISORDER: Primary | ICD-10-CM

## 2020-03-17 DIAGNOSIS — F33.1 MDD (MAJOR DEPRESSIVE DISORDER), RECURRENT EPISODE, MODERATE: ICD-10-CM

## 2020-03-17 PROCEDURE — 99999 PR PBB SHADOW E&M-EST. PATIENT-LVL II: CPT | Mod: PBBFAC,,, | Performed by: NURSE PRACTITIONER

## 2020-03-17 PROCEDURE — 99999 PR PBB SHADOW E&M-EST. PATIENT-LVL II: ICD-10-PCS | Mod: PBBFAC,,, | Performed by: NURSE PRACTITIONER

## 2020-03-17 PROCEDURE — 99205 OFFICE O/P NEW HI 60 MIN: CPT | Mod: S$GLB,,, | Performed by: NURSE PRACTITIONER

## 2020-03-17 PROCEDURE — 99205 PR OFFICE/OUTPT VISIT, NEW, LEVL V, 60-74 MIN: ICD-10-PCS | Mod: S$GLB,,, | Performed by: NURSE PRACTITIONER

## 2020-03-17 RX ORDER — OLANZAPINE 10 MG/1
TABLET ORAL
Qty: 60 TABLET | Refills: 2 | Status: SHIPPED | OUTPATIENT
Start: 2020-03-17 | End: 2020-03-24

## 2020-03-17 NOTE — PROGRESS NOTES
"3/17/2020 6:13 PM   Lesia Lawson   1995   [id number]           OUTPATIENT PSYCHIATRY INITIAL EVALUATION NOTE      Lesia Lawson, a 24 y.o. male, presenting for initial evaluation visit. Met with patient.    Reason for Encounter: self-referral. Patient complains of depression and anxiety. He stated, "I am here because I have been watching pronography for years. It escalated and realized that I have been attacked by evil spirits. I was admitted at [facility] and saw Dr. Jiménez. He told me this is like a push and pull between demonic spirits."     History of Present Illness:    Patient is 24 year old male who presented for initial psychiatric evaluatio. He was discharged from [facility] inpatient facility a week ago. He stated that he has been experiencing what he described as a strange feeling or sensation that the devil is out to hurt him and hurt his mother.for the past 3 weeks. When asked to elaborate further he stated, "the devil wanted to attack my mother. My mother is praying and I was concerned that they are going to attack her. I think this happening because I watched ponography." When he was asked about the reason for his [facility] hospitalization he stated the devil was telling him to harm himself and harm other. He also stated he was prescribed Zyprexa 15 mg po BID and finds helpful in calming him downs and clears his head but reported its very strong and it puts him to sleep. He also stated when he missed his doses the sensations come back. He then stated, "last night I had an issue, I felt something came over me. I was eating with my niece and there was something that tells me to  hurt her with a fork. It frightened me and I ignore it." He reported that he did not want to watch pronography and talked about being devoted to [facility]. He stated that God talked to him twice. He reported feeling anxious and irritable at times.  He complained of decreased concentration and stated that he is very short " attentions span. He denied AI/HI/AVH. We contracted for safety and steps to take to stay safe when he has suicidal thoughts. He also stated taking 15 mg po daily affects his ability to work and it makes unable to work and sleep day and night. We discussed decreasing zyprex to 5 mg po qam Zyprexa 15 mg po qhs.       Psychosocial stressors: Dealing with these what he calls negative sensations    Psychiatric Review Of Systems - Is patient experiencing or having changes in:    Symptoms of Depression: Endorsed diminished mood or loss of interest/anhedonia; irritability, diminished energy, change in sleep, change in appetite, diminished concentration or cognition or indecisiveness, PMA/R, excessive guilt or hopelessness or worthlessness. He denied having suicidal ideation at this but had then once at [facility] and the stated that evil spirits wanted him to bang his head on the walls. He denied prior suicide attempts  Onset was approximately 3 weeks go. Symptoms have been improving since he started taking zyprexa      Symptoms of ANGELIC: Endorsed excessive anxiety/worry/fear, more days than not, about numerous issues, difficult to control, with restlessness, fatigue, poor concentration, irritability, muscle tension, sleep disturbance; causes functionally impairing distress. Onset was approximately 3 weeks go. Symptoms have been {clinical       Symptoms of sumeet or hypomania: No elevated, expansive, or irritable mood with increased energy or activity; with inflated self-esteem or grandiosity, decreased need for sleep, increased rate of speech,  racing thoughts, distractibility, increased goal directed activity or PMA, risky/disinhibited behavior    Symptoms of psychosis: No hallucinations, delusions, disorganized thinking, disorganized behavior or abnormal motor behavior, or negative symptoms (diminshed emotional expression, avolition, anhedonia, alogia, asociality. He reported sesations  Onset was approximately 3 weeks ago.  Symptoms have been {clinical c    Sleep: No problems with  sleep initiation, maintenance, early morning awakening with inability to return to sleep    Risk Parameters:  Patient reports no suicidal ideation  Patient reports no homicidal ideation  Patient reports no self-injurious behavior  Patient reports no violent behavior    Other symptoms    Symptoms of Panic Disorder: No recurrent panic attacks, precipitated or un-precipitated, source of worry and/or behavioral changes secondary; with or without agoraphobia    Symptoms of PTSD: Reported h/o  emotional trauma;  No PTSD symptom of re-experiencing/intrusive symptoms, avoidant behavior, negative alterations in cognition or mood, or hyperarousal symptoms; with or without dissociative symptoms     Symptoms of OCD: No obsessions, compulsions or ruminations    Symptoms of Eating Disorders: No anorexia, bulimia or binging    Symptoms of ADHD: No inattention or hyperactivity    Substance Use:   No intoxication, withdrawal, tolerance, used in larger amounts or duration than intended, unsuccessful attempts to limit or quit, increased time engaging in or seeking out, cravings or strong desire to use, failure to fulfill obligations, negative consequences in social/interpersonal/occupational,/recreational areas, use in dangerous situations, medical or psychological consequences     Psychotropic medication review  Previous Trials-  Current meds-zyprexa 15 mg po BID    HISTORY     No past medical history on file.      History of Seizures or TBI: NO    No past surgical history on file.    Family History   Problem Relation Age of Onset    Mental illness Maternal Uncle         schizophrenia    Cancer Maternal Grandmother         colon    Diabetes Maternal Grandmother         adult    Hyperlipidemia Maternal Grandmother     Hypertension Maternal Grandmother     No Known Problems Mother     Hypertension Father     Asthma Neg Hx     Birth defects Neg Hx     Chromosomal  disorder Neg Hx     Early death Neg Hx     Heart disease Neg Hx     Seizures Neg Hx     Thyroid disease Neg Hx     Other Neg Hx        Social History     Socioeconomic History    Marital status: Single     Spouse name: Not on file    Number of children: Not on file    Years of education: Not on file    Highest education level: Not on file   Occupational History    Not on file   Social Needs    Financial resource strain: Not on file    Food insecurity:     Worry: Not on file     Inability: Not on file    Transportation needs:     Medical: Not on file     Non-medical: Not on file   Tobacco Use    Smoking status: Never Smoker    Smokeless tobacco: Never Used   Substance and Sexual Activity    Alcohol use: No    Drug use: No    Sexual activity: Never   Lifestyle    Physical activity:     Days per week: Not on file     Minutes per session: Not on file    Stress: Not on file   Relationships    Social connections:     Talks on phone: Not on file     Gets together: Not on file     Attends Confucianist service: Not on file     Active member of club or organization: Not on file     Attends meetings of clubs or organizations: Not on file     Relationship status: Not on file   Other Topics Concern    Not on file   Social History Narrative    Lives with mom, sister, brother, sister; dad; no pets           OBJECTIVE       Constitutional  Vitals:  Most recent vital signs, dated greater than 90 days prior to this appointment, were reviewed.    Vitals:    03/17/20 1939   BP: 105/80   Pulse: 70            Laboratory Data: Reviewed most recent     Medications:  Outpatient Encounter Medications as of 3/17/2020   Medication Sig Dispense Refill    OLANZapine (ZYPREXA) 10 MG tablet Take 1/2 tab po qam and 1.5 tab at night 60 tablet 2     No facility-administered encounter medications on file as of 3/17/2020.        Allergy:  Review of patient's allergies indicates:  No Known Allergies    Nutritional Screening:  "Considering the patient's height and weight, medications, medical history and preferences, should a referral be made to the dietitian? no    Review of Systems:  General: unremarkable, age appropriate  Resp:  No shortness of breath, hyperventilation or cough  Cvs:  No tachycardia or chest pain  Gi:  No nausea, vomiting, pain, constipation or diarrhea  Musculoskeletal:  No pain or stiffness of the joints  Muscle Strength/Tone:no tremor, no tic  Neurological:  No weakness, sensory changes, seizures, confusion, memory loss, tremor or other abnormal movements   Gait & Station:non-ataxic    AIMS:  0    Mental Status Exam:  Appearance: unremarkable, age appropriate, casually dressed  Behavior/Cooperation:appropriate friendly and cooperative   Speech: appropriate rate, volume and tone spontaneous   Language: uses words appropriately; NO aphasia or dysarthria  Mood: " mellow "  Affect:  congruent with mood and appropriate to situation/content  Thought Process:  normal and logical  Thought Content: normal, no suicidality, no homicidality, delusions, or paranoia, hallucinations: (illusions: yes)  Sensorium:  Awake  Alert and Oriented: x3 grossly intact  Memory: Intact to conversation both recent and remote  Attention/concentration: appropriate for age/education.   Insight: Intact per patient understanding of illness  Judgment:Intact per recent behavior      Strengths and Liabilities: Strength: Patient accepts guidance/feedback, Strength: Patient is motivated for change., Liability: Patient lacks coping skills.    ASSESSMENT     Impression: Brief psychotic episode  Major depression disorder, recurrent severe      ICD-10-CM ICD-9-CM   1. Brief psychotic disorder F23 298.8   2. MDD (major depressive disorder), recurrent episode, moderate F33.1 296.32       Treatment Goals:  Specify outcomes written in observable, behavioral terms:   Depression: acquiring relapse prevention skills  Anxiety: acquire relapse prevention " skills    TREATMENT PLAN     · Medication Management:   · Decrease zyprexa to 5 m po qam and 15 mg po qha for current symptoms  · Discussed decreasing Zyprexa in the future and starting vraylar.  · Labs: reviewed most recent labs. New labs due 3/2021  · The treatment plan and follow up plan were reviewed with the patient.  · Discussed with patient informed consent, risks vs. benefits, alternative treatments, side effect profile and the inherent unpredictability of individual responses to these treatments. The patient expresses understanding of the above and displays the capacity to agree with this current plan and had no other questions.  · Encouraged Patient to keep future appointments.   · Take medications as prescribed and abstain from substance abuse.   · In the event of an emergency patient was advised to go to the emergency room.  · Referral for further treatment to social work team for psychotherapy    Return to Clinic:  2 weeks    > than 50% of total time spend on coordination of care and counseling   (which included pts differential diagnosis and prognosis for psychiatric conditions, risks, benefits of treatments, instructions and adherence to treatment plan, risk reduction, reviewing current psychiatric medication regimen, medical problems and social stressors. In addtion to possible discussion with other healthcare provider/s)    Add on Psychotherapy time: 0  Total Face to face time: 60mins    Rosamaria Garvin, MS, MSN, LPC, APRN, PMHNP-BC  [facility]   3/17/2020 6:13 PM

## 2020-03-18 ENCOUNTER — OFFICE VISIT (OUTPATIENT)
Dept: PSYCHIATRY | Facility: CLINIC | Age: 25
End: 2020-03-18
Payer: COMMERCIAL

## 2020-03-18 DIAGNOSIS — R69 PSYCHIATRIC DIAGNOSIS DEFERRED: Primary | ICD-10-CM

## 2020-03-18 PROCEDURE — 90834 PSYTX W PT 45 MINUTES: CPT | Mod: S$GLB,,, | Performed by: SOCIAL WORKER

## 2020-03-18 PROCEDURE — 90834 PR PSYCHOTHERAPY W/PATIENT, 45 MIN: ICD-10-PCS | Mod: S$GLB,,, | Performed by: SOCIAL WORKER

## 2020-03-18 NOTE — PATIENT INSTRUCTIONS
Olanzapine tablets  What is this medicine?  OLANZAPINE (oh NIR za peen) is used to treat schizophrenia, psychotic disorders, and bipolar disorder. Bipolar disorder is also known as manic-depression.  How should I use this medicine?  Take this medicine by mouth. Swallow it with a drink of water. Follow the directions on the prescription label. Take your medicine at regular intervals. Do not take it more often than directed. Do not stop taking except on the advice of your doctor or health care professional.  A special MedGuide will be given to you by the pharmacist with each new prescription and refill. Be sure to read this information carefully each time.  Talk to your pediatrician regarding the use of this medicine in children. While this drug may be prescribed for children as young as 13 years for selected conditions, precautions do apply.  What side effects may I notice from receiving this medicine?  Side effects that you should report to your doctor or health care professional as soon as possible:  · allergic reactions like skin rash, itching or hives, swelling of the face, lips, or tongue  · breathing problems  · difficulty in speaking or swallowing  · excessive thirst and/or hunger  · fast heartbeat (palpitations)  · fever or chills, sore throat  · fever with rash, swollen lymph nodes, or swelling of the face  · frequently needing to urinate  · inability to control muscle movements in the face, hands, arms, or legs  · painful or prolonged erections  · redness, blistering, peeling or loosening of the skin, including inside the mouth  · restlessness or need to keep moving  · seizures (convulsions)  · stiffness, spasms  · tremors or trembling  Side effects that usually do not require medical attention (report to your doctor or health care professional if they continue or are bothersome):  · changes in sexual desire  · constipation  · drowsiness  · lowered blood pressure  What may interact with this medicine?  Do  not take this medicine with any of the following medications:  · certain antibiotics like grepafloxacin and sparfloxacin  · certain phenothiazines like chlorpromazine, mesoridazine, and thioridazine  · cisapride  · clozapine  · droperidol  · halofantrine  · levomethadyl  · pimozide  This medicine may also interact with the following medications:  · carbamazepine  · charcoal  · fluvoxamine  · levodopa and other medicines for Parkinson's disease  · medicines for diabetes  · medicines for high blood pressure  · medicines for mental depression, anxiety, other mood disorders, or sleeping problems  · omeprazole  · rifampin  · ritonavir  · tobacco from cigarettes  What if I miss a dose?  If you miss a dose, take it as soon as you can. If it is almost time for your next dose, take only that dose. Do not take double or extra doses.  Where should I keep my medicine?  Keep out of the reach of children.  Store at controlled room temperature between 15 and 30 degrees C (59 and 86 degrees F). Protect from light and moisture. Throw away any unused medicine after the expiration date.  What should I tell my health care provider before I take this medicine?  They need to know if you have any of these conditions:  · breast cancer or history of breast cancer  · cigarette smoker  · dementia  · diabetes mellitus, high blood sugar or a family history of diabetes  · difficulty swallowing  · glaucoma  · heart disease, irregular heartbeat, or previous heart attack  · history of brain tumor or head injury  · kidney or liver disease  · low blood pressure or dizziness when standing up  · Parkinson's disease  · prostate trouble  · seizures (convulsions)  · suicidal thoughts, plans, or attempt by you or a family member  · an unusual or allergic reaction to olanzapine, other medicines, foods, dyes, or preservatives  · pregnant or trying to get pregnant  · breast-feeding  What should I watch for while using this medicine?  Visit your doctor or  health care professional for regular checks on your progress. It may be several weeks before you see the full effects of this medicine. Notify your doctor or health care professional if your symptoms get worse, if you have new symptoms, if you are having an unusual effect from this medicine, or if you feel out of control, very discouraged or think you might harm yourself or others.  Do not suddenly stop taking this medicine. You may need to gradually reduce the dose. Ask your doctor or health care professional for advice.  You may get dizzy or drowsy. Do not drive, use machinery, or do anything that needs mental alertness until you know how this medicine affects you. Do not stand or sit up quickly, especially if you are an older patient. This reduces the risk of dizzy or fainting spells.  Avoid alcoholic drinks. Alcohol can increase dizziness and drowsiness with olanzapine.  Do not treat yourself for colds, diarrhea or allergies without asking your doctor or health care professional for advice. Some ingredients can increase possible side effects.  Your mouth may get dry. Chewing sugarless gum or sucking hard candy, and drinking plenty of water will help.  This medicine can reduce the response of your body to heat or cold. Dress warm in cold weather and stay hydrated in hot weather. If possible, avoid extreme temperatures like saunas, hot tubs, very hot or cold showers, or activities that can cause dehydration such as vigorous exercise.  If you notice an increased hunger or thirst, different from your normal hunger or thirst, or if you find that you have to urinate more frequently, you should contact your health care provider as soon as possible. You may need to have your blood sugar monitored. This medicine may cause changes in your blood sugar levels. You should monitor you blood sugar frequently if you have diabetes.  If you smoke, tell your doctor if you notice this medicine is not working well for you. Talk to  your doctor if you are a smoker or if you decide to stop smoking.  NOTE:This sheet is a summary. It may not cover all possible information. If you have questions about this medicine, talk to your doctor, pharmacist, or health care provider. Copyright© 2017 [employer]

## 2020-03-22 ENCOUNTER — PATIENT MESSAGE (OUTPATIENT)
Dept: PSYCHIATRY | Facility: CLINIC | Age: 25
End: 2020-03-22

## 2020-03-24 RX ORDER — BUSPIRONE HYDROCHLORIDE 5 MG/1
5 TABLET ORAL 3 TIMES DAILY
Qty: 90 TABLET | Refills: 1 | Status: SHIPPED | OUTPATIENT
Start: 2020-03-24 | End: 2020-03-26

## 2020-03-24 RX ORDER — OLANZAPINE 10 MG/1
TABLET ORAL
Qty: 60 TABLET | Refills: 2 | Status: ON HOLD | OUTPATIENT
Start: 2020-03-24 | End: 2020-04-03 | Stop reason: HOSPADM

## 2020-03-24 RX ORDER — OLANZAPINE 20 MG/1
10 TABLET ORAL 2 TIMES DAILY
Qty: 30 TABLET | Refills: 1 | Status: ON HOLD | OUTPATIENT
Start: 2020-03-24 | End: 2020-04-03 | Stop reason: HOSPADM

## 2020-03-25 ENCOUNTER — OFFICE VISIT (OUTPATIENT)
Dept: PSYCHIATRY | Facility: CLINIC | Age: 25
End: 2020-03-25
Payer: COMMERCIAL

## 2020-03-25 DIAGNOSIS — R69 PSYCHIATRIC DIAGNOSIS DEFERRED: Primary | ICD-10-CM

## 2020-03-25 PROCEDURE — 90834 PSYTX W PT 45 MINUTES: CPT | Mod: S$GLB,,, | Performed by: SOCIAL WORKER

## 2020-03-25 PROCEDURE — 90834 PR PSYCHOTHERAPY W/PATIENT, 45 MIN: ICD-10-PCS | Mod: S$GLB,,, | Performed by: SOCIAL WORKER

## 2020-03-26 ENCOUNTER — TELEPHONE (OUTPATIENT)
Dept: PHARMACY | Facility: CLINIC | Age: 25
End: 2020-03-26

## 2020-03-26 DIAGNOSIS — F23 BRIEF PSYCHOTIC DISORDER: Primary | ICD-10-CM

## 2020-03-26 RX ORDER — ZIPRASIDONE HYDROCHLORIDE 20 MG/1
20 CAPSULE ORAL 2 TIMES DAILY
Qty: 60 CAPSULE | Refills: 1 | Status: ON HOLD | OUTPATIENT
Start: 2020-03-26 | End: 2020-04-03 | Stop reason: HOSPADM

## 2020-03-26 RX ORDER — BUSPIRONE HYDROCHLORIDE 5 MG/1
5 TABLET ORAL 3 TIMES DAILY
Qty: 90 TABLET | Refills: 1 | Status: ON HOLD | OUTPATIENT
Start: 2020-03-26 | End: 2020-04-03 | Stop reason: HOSPADM

## 2020-03-28 ENCOUNTER — HOSPITAL ENCOUNTER (EMERGENCY)
Facility: HOSPITAL | Age: 25
Discharge: PSYCHIATRIC OR SUBSTANCE USE FACILITY | End: 2020-03-28
Attending: EMERGENCY MEDICINE
Payer: COMMERCIAL

## 2020-03-28 VITALS
RESPIRATION RATE: 18 BRPM | DIASTOLIC BLOOD PRESSURE: 71 MMHG | BODY MASS INDEX: 29.53 KG/M2 | HEART RATE: 80 BPM | OXYGEN SATURATION: 97 % | WEIGHT: 230 LBS | TEMPERATURE: 98 F | SYSTOLIC BLOOD PRESSURE: 126 MMHG

## 2020-03-28 DIAGNOSIS — R45.850 HOMICIDAL IDEATION: Primary | ICD-10-CM

## 2020-03-28 LAB
ALBUMIN SERPL BCP-MCNC: 4.2 G/DL (ref 3.5–5.2)
ALP SERPL-CCNC: 59 U/L (ref 55–135)
ALT SERPL W/O P-5'-P-CCNC: 25 U/L (ref 10–44)
AMPHET+METHAMPHET UR QL: NEGATIVE
ANION GAP SERPL CALC-SCNC: 12 MMOL/L (ref 8–16)
APAP SERPL-MCNC: <3 UG/ML (ref 10–20)
AST SERPL-CCNC: 18 U/L (ref 10–40)
BARBITURATES UR QL SCN>200 NG/ML: NEGATIVE
BASOPHILS # BLD AUTO: 0.03 K/UL (ref 0–0.2)
BASOPHILS NFR BLD: 0.4 % (ref 0–1.9)
BENZODIAZ UR QL SCN>200 NG/ML: NEGATIVE
BILIRUB SERPL-MCNC: 0.3 MG/DL (ref 0.1–1)
BILIRUB UR QL STRIP: NEGATIVE
BUN SERPL-MCNC: 9 MG/DL (ref 6–20)
BZE UR QL SCN: NEGATIVE
CALCIUM SERPL-MCNC: 9.2 MG/DL (ref 8.7–10.5)
CANNABINOIDS UR QL SCN: NEGATIVE
CHLORIDE SERPL-SCNC: 103 MMOL/L (ref 95–110)
CLARITY UR REFRACT.AUTO: CLEAR
CO2 SERPL-SCNC: 24 MMOL/L (ref 23–29)
COLOR UR AUTO: NORMAL
CREAT SERPL-MCNC: 1.2 MG/DL (ref 0.5–1.4)
CREAT UR-MCNC: 61 MG/DL (ref 23–375)
DIFFERENTIAL METHOD: ABNORMAL
EOSINOPHIL # BLD AUTO: 0.1 K/UL (ref 0–0.5)
EOSINOPHIL NFR BLD: 2 % (ref 0–8)
ERYTHROCYTE [DISTWIDTH] IN BLOOD BY AUTOMATED COUNT: 11.6 % (ref 11.5–14.5)
EST. GFR  (AFRICAN AMERICAN): >60 ML/MIN/1.73 M^2
EST. GFR  (NON AFRICAN AMERICAN): >60 ML/MIN/1.73 M^2
ETHANOL SERPL-MCNC: <10 MG/DL
GLUCOSE SERPL-MCNC: 84 MG/DL (ref 70–110)
GLUCOSE UR QL STRIP: NEGATIVE
HCT VFR BLD AUTO: 50.9 % (ref 40–54)
HGB BLD-MCNC: 15.5 G/DL (ref 14–18)
HGB UR QL STRIP: NEGATIVE
IMM GRANULOCYTES # BLD AUTO: 0.01 K/UL (ref 0–0.04)
IMM GRANULOCYTES NFR BLD AUTO: 0.1 % (ref 0–0.5)
KETONES UR QL STRIP: NEGATIVE
LEUKOCYTE ESTERASE UR QL STRIP: NEGATIVE
LYMPHOCYTES # BLD AUTO: 2.7 K/UL (ref 1–4.8)
LYMPHOCYTES NFR BLD: 38.4 % (ref 18–48)
MCH RBC QN AUTO: 28.4 PG (ref 27–31)
MCHC RBC AUTO-ENTMCNC: 30.5 G/DL (ref 32–36)
MCV RBC AUTO: 93 FL (ref 82–98)
METHADONE UR QL SCN>300 NG/ML: NEGATIVE
MONOCYTES # BLD AUTO: 0.7 K/UL (ref 0.3–1)
MONOCYTES NFR BLD: 10.1 % (ref 4–15)
NEUTROPHILS # BLD AUTO: 3.4 K/UL (ref 1.8–7.7)
NEUTROPHILS NFR BLD: 49 % (ref 38–73)
NITRITE UR QL STRIP: NEGATIVE
NRBC BLD-RTO: 0 /100 WBC
OPIATES UR QL SCN: NEGATIVE
PCP UR QL SCN>25 NG/ML: NEGATIVE
PH UR STRIP: 6 [PH] (ref 5–8)
PLATELET # BLD AUTO: 284 K/UL (ref 150–350)
PMV BLD AUTO: 8.6 FL (ref 9.2–12.9)
POTASSIUM SERPL-SCNC: 3.6 MMOL/L (ref 3.5–5.1)
PROT SERPL-MCNC: 7.7 G/DL (ref 6–8.4)
PROT UR QL STRIP: NEGATIVE
RBC # BLD AUTO: 5.45 M/UL (ref 4.6–6.2)
SALICYLATES SERPL-MCNC: <5 MG/DL (ref 15–30)
SODIUM SERPL-SCNC: 139 MMOL/L (ref 136–145)
SP GR UR STRIP: 1 (ref 1–1.03)
TOXICOLOGY INFORMATION: NORMAL
TSH SERPL DL<=0.005 MIU/L-ACNC: 1.28 UIU/ML (ref 0.4–4)
URN SPEC COLLECT METH UR: NORMAL
WBC # BLD AUTO: 6.96 K/UL (ref 3.9–12.7)

## 2020-03-28 PROCEDURE — 99285 PR EMERGENCY DEPT VISIT,LEVEL V: ICD-10-PCS | Mod: ,,, | Performed by: EMERGENCY MEDICINE

## 2020-03-28 PROCEDURE — 80329 ANALGESICS NON-OPIOID 1 OR 2: CPT

## 2020-03-28 PROCEDURE — 80053 COMPREHEN METABOLIC PANEL: CPT

## 2020-03-28 PROCEDURE — 99285 EMERGENCY DEPT VISIT HI MDM: CPT | Mod: ,,, | Performed by: EMERGENCY MEDICINE

## 2020-03-28 PROCEDURE — 99285 EMERGENCY DEPT VISIT HI MDM: CPT

## 2020-03-28 PROCEDURE — 84443 ASSAY THYROID STIM HORMONE: CPT

## 2020-03-28 PROCEDURE — 85025 COMPLETE CBC W/AUTO DIFF WBC: CPT

## 2020-03-28 PROCEDURE — 80320 DRUG SCREEN QUANTALCOHOLS: CPT

## 2020-03-28 PROCEDURE — 81003 URINALYSIS AUTO W/O SCOPE: CPT

## 2020-03-28 PROCEDURE — 80307 DRUG TEST PRSMV CHEM ANLYZR: CPT

## 2020-03-29 ENCOUNTER — TELEPHONE (OUTPATIENT)
Dept: PSYCHIATRY | Facility: CLINIC | Age: 25
End: 2020-03-29
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
## 2020-03-29 PROBLEM — Z13.9 ENCOUNTER FOR MEDICAL SCREENING EXAMINATION: Status: ACTIVE | Noted: 2020-03-29

## 2020-03-29 NOTE — ED PROVIDER NOTES
Encounter Date: 3/28/2020       History     Chief Complaint   Patient presents with    Psychiatric Evaluation     having violent thoughts for about a month, worsened today. thinks its because the changed his meds recently. Denies SI/HI, fever, cough, chills     24 year old male with medical history of depression, brief psychotic episode presenting to the ED with the chief complaint of psychiatric evaluation. Patient has been taking Zyprexa since his last psychiatric admission earlier this month. He has been having depressed thoughts and decreased sleep and has been trying different combination of AM and PM dosing. He discontinued taking Zyprexa yesterday and received a new prescription for Geodon which he has not started yet. He was additionally prescribed Vraylar which he has taken 1 dose of today. He reports developing thoughts of wanting to hurt another person today. He does not know which specific person and does not have a plan currently. He lives by himself and worries that he may act out on these thoughts if he was returned home. I spoke with the patient's mother who reports he expressed these thoughts to her. She offered to spend the night with him at his house, but he refused as he thought he may hurt her. Patient's mother would like advise on his psychiatric medications. He denies SI or hallucinations. He denies recreational drug use or ETOH use.     The history is provided by the patient and a parent.     Review of patient's allergies indicates:  No Known Allergies  Past Medical History:   Diagnosis Date    Depression      History reviewed. No pertinent surgical history.  Family History   Problem Relation Age of Onset    Mental illness Maternal Uncle         schizophrenia    Cancer Maternal Grandmother         colon    Diabetes Maternal Grandmother         adult    Hyperlipidemia Maternal Grandmother     Hypertension Maternal Grandmother     No Known Problems Mother     Hypertension Father      Asthma Neg Hx     Birth defects Neg Hx     Chromosomal disorder Neg Hx     Early death Neg Hx     Heart disease Neg Hx     Seizures Neg Hx     Thyroid disease Neg Hx     Other Neg Hx      Social History     Tobacco Use    Smoking status: Never Smoker    Smokeless tobacco: Never Used   Substance Use Topics    Alcohol use: No    Drug use: No     Review of Systems   Constitutional: Negative for chills, diaphoresis and fever.   HENT: Negative for congestion, sore throat and trouble swallowing.    Eyes: Negative for pain and redness.   Respiratory: Negative for cough and shortness of breath.    Cardiovascular: Negative for chest pain.   Gastrointestinal: Negative for abdominal pain, nausea and vomiting.   Genitourinary: Negative for dysuria.   Musculoskeletal: Negative for back pain, neck pain and neck stiffness.   Skin: Negative for rash.   Neurological: Negative for weakness, light-headedness and headaches.   Hematological: Does not bruise/bleed easily.   Psychiatric/Behavioral: Negative for agitation, behavioral problems, confusion and dysphoric mood.        +Homidical thoughts       Physical Exam     Initial Vitals [03/28/20 1946]   BP Pulse Resp Temp SpO2   (!) 165/75 77 18 98.6 °F (37 °C) 100 %      MAP       --         Physical Exam    Constitutional: He appears well-developed and well-nourished. He is not diaphoretic. No distress.   Calm, polite, cooperative with exam and questioning   HENT:   Head: Normocephalic and atraumatic.   Mouth/Throat: Oropharynx is clear and moist. No oropharyngeal exudate.   Eyes: EOM are normal. Pupils are equal, round, and reactive to light.   Neck: Normal range of motion. Neck supple.   Cardiovascular: Normal rate and regular rhythm.   Pulmonary/Chest: No respiratory distress. He has no wheezes.   Speaking full sentences without difficulty. No accessory muscle usage.   Abdominal: Soft. There is no tenderness.   Musculoskeletal: Normal range of motion. He exhibits no  tenderness.   Neurological: He is alert and oriented to person, place, and time. He has normal strength. No cranial nerve deficit or sensory deficit.   Skin: Skin is warm and dry. No rash noted. No erythema.   Psychiatric: His speech is normal. His affect is blunt. He is not agitated, not aggressive and not hyperactive. He expresses inappropriate judgment. He expresses homicidal ideation. He expresses no suicidal ideation. He expresses no suicidal plans and no homicidal plans.         ED Course   Procedures  Labs Reviewed   CBC W/ AUTO DIFFERENTIAL - Abnormal; Notable for the following components:       Result Value    Mean Corpuscular Hemoglobin Conc 30.5 (*)     MPV 8.6 (*)     All other components within normal limits   ACETAMINOPHEN LEVEL - Abnormal; Notable for the following components:    Acetaminophen (Tylenol), Serum <3.0 (*)     All other components within normal limits   SALICYLATE LEVEL - Abnormal; Notable for the following components:    Salicylate Lvl <5.0 (*)     All other components within normal limits   COMPREHENSIVE METABOLIC PANEL   TSH   URINALYSIS, REFLEX TO URINE CULTURE    Narrative:     Preferred Collection Type->Urine, Clean Catch   DRUG SCREEN PANEL, URINE EMERGENCY    Narrative:     Preferred Collection Type->Urine, Clean Catch   ALCOHOL,MEDICAL (ETHANOL)          Imaging Results    None          Medical Decision Making:   History:   I obtained history from: someone other than patient.       <> Summary of History: Patient and his mother  Old Medical Records: I decided to obtain old medical records.  Old Records Summarized: records from clinic visits and records from previous admission(s).  Clinical Tests:   Lab Tests: Ordered and Reviewed       APC / Resident Notes:   24 year old male with medical history of depression, brief psychotic episode presenting to the ED with 1 day of homicidal thoughts. Does not have thoughts regarding a specific person or has a plan. Expresses concern recent  adjustments on his psychiatric medications are leading him to have these thoughts. DDx includes but not limited to acute psychosis, medication side effect, schizophrenia, bipolar disorder, social stressors, alcohol or drug abuse, metabolic abnormality.    PEC placed for homicidal thoughts. Psychiatric laboratory work obtained without significant findings. Patient medically cleared for placement. I have discussed the care of this patient with my supervising physician.                           Medically cleared for psychiatry placement: 3/28/2020 10:00 PM    Clinical Impression:       ICD-10-CM ICD-9-CM   1. Homicidal ideation R45.850 V62.85         Disposition:   Disposition: Transferred  Condition: Fair     ED Disposition Condition    Transfer to Psych Facility         ED Prescriptions     None        Follow-up Information    None                                    Francisco Hinkle PA-C  03/28/20 [id number]

## 2020-03-29 NOTE — ED NOTES
The patient is changing into blue paper scrubs, he remains cooperative. He is checked for contraband, none found. He states that he has HI due to a change in his medication. He has not identified anyone specifically. He states that he was taking Zyprexa but it was making him depressed. He is unable to state the name of the new medication that replaced the Zyprexa. He denies SI, A/VH. His affect is blunted, mood is low but he maintains a pleasant demeanor. He is informed of the plan of care & acknowledges understanding. He is placed on SVC for safety.

## 2020-03-29 NOTE — ED NOTES
The patient departed the [facility] @ 2358 per [facility]'s Transportation. One belongings bag given to [facility]'s staff. The patient departed w/o any complaints. He did not wish for anyone to be notified of his transfer.

## 2020-03-29 NOTE — ED NOTES
The patient arrived to the [facility] @ 2215 via w/c & was escorted by this nurse, ED sitter & two hospital security officers. He is ambulatory w/ a steady gait. He is attired in blue paper hospital scrubs w/ good grooming & hygiene. He was checked for contraband, none found. His affect is blunted, mood is pleasant & cooperative. He presents on a PEC status written by Dr. Simmons on 03/28/2020 @ 2047. He states that he had homicidal thoughts beginning on today & attributes the HI to a change in his psychotropic medication. He states that he was taking Olanzapine 15 mg po BID which caused him to become depressed. He is unable to state the name of his most recent medication. He denies SI, A/VH. He was informed of the plan of care & acknowledges understanding. He expressed a desire to return to [facility]. He is placed on direct visual observation. Provided w/ two servings of Orange Juice & is currently watching TV.

## 2020-03-29 NOTE — TELEPHONE ENCOUNTER
Received a message that patient has been hospitalized at [facility]. He tried vralyar only once and did not like it.

## 2020-03-29 NOTE — ED TRIAGE NOTES
Pt states that he has HI due to a change in his medication. He has not identified anyone specifically. He states that he was taking Zyprexa but it was making him depressed. He is unable to state the name of the new medication that replaced the Zyprexa. He denies SI, A/VH. His affect is blunted, mood is low but he maintains a pleasant demeanor. He is informed of the plan of care & acknowledges understanding.

## 2020-03-29 NOTE — ED NOTES
Preparing to escort the patient to the ERE. He remains pleasant & engaging. Maintained on SVC for safety.

## 2020-03-29 NOTE — ED NOTES
Pt transferred to [facility] via wheelchair with tech, [facility] nurse Nabila, and security

## 2020-03-29 NOTE — ED NOTES
Report given to nurse Stephen @ [facility], [phone]. Lying in bed on the top of bedding w/ eyes closed, rise/fall of chest noted. Maintained on direct visual observation.
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
## 2020-04-03 PROBLEM — Z13.9 ENCOUNTER FOR MEDICAL SCREENING EXAMINATION: Status: RESOLVED | Noted: 2020-03-29 | Resolved: 2020-04-03

## 2020-04-15 ENCOUNTER — HOSPITAL ENCOUNTER (EMERGENCY)
Facility: HOSPITAL | Age: 25
Discharge: PSYCHIATRIC OR SUBSTANCE USE FACILITY | End: 2020-04-16
Attending: EMERGENCY MEDICINE
Payer: COMMERCIAL

## 2020-04-15 ENCOUNTER — OFFICE VISIT (OUTPATIENT)
Dept: PSYCHIATRY | Facility: CLINIC | Age: 25
End: 2020-04-15
Payer: COMMERCIAL

## 2020-04-15 VITALS
TEMPERATURE: 98 F | HEART RATE: 83 BPM | DIASTOLIC BLOOD PRESSURE: 79 MMHG | HEIGHT: 72 IN | SYSTOLIC BLOOD PRESSURE: 123 MMHG | OXYGEN SATURATION: 100 % | WEIGHT: 230 LBS | RESPIRATION RATE: 18 BRPM | BODY MASS INDEX: 31.15 KG/M2

## 2020-04-15 DIAGNOSIS — F20.81 SCHIZOPHRENIFORM DISORDER: Primary | ICD-10-CM

## 2020-04-15 DIAGNOSIS — R45.851 SUICIDAL IDEATION: Primary | ICD-10-CM

## 2020-04-15 DIAGNOSIS — F41.1 GAD (GENERALIZED ANXIETY DISORDER): ICD-10-CM

## 2020-04-15 LAB
ALBUMIN SERPL BCP-MCNC: 4.3 G/DL (ref 3.5–5.2)
ALP SERPL-CCNC: 57 U/L (ref 55–135)
ALT SERPL W/O P-5'-P-CCNC: 28 U/L (ref 10–44)
AMPHET+METHAMPHET UR QL: NEGATIVE
ANION GAP SERPL CALC-SCNC: 9 MMOL/L (ref 8–16)
APAP SERPL-MCNC: <3 UG/ML (ref 10–20)
AST SERPL-CCNC: 17 U/L (ref 10–40)
BARBITURATES UR QL SCN>200 NG/ML: NEGATIVE
BASOPHILS # BLD AUTO: 0.03 K/UL (ref 0–0.2)
BASOPHILS NFR BLD: 0.4 % (ref 0–1.9)
BENZODIAZ UR QL SCN>200 NG/ML: NEGATIVE
BILIRUB SERPL-MCNC: 0.4 MG/DL (ref 0.1–1)
BILIRUB UR QL STRIP: NEGATIVE
BUN SERPL-MCNC: 9 MG/DL (ref 6–20)
BZE UR QL SCN: NEGATIVE
CALCIUM SERPL-MCNC: 9.6 MG/DL (ref 8.7–10.5)
CANNABINOIDS UR QL SCN: NEGATIVE
CHLORIDE SERPL-SCNC: 103 MMOL/L (ref 95–110)
CLARITY UR REFRACT.AUTO: CLEAR
CO2 SERPL-SCNC: 28 MMOL/L (ref 23–29)
COLOR UR AUTO: YELLOW
CREAT SERPL-MCNC: 1.1 MG/DL (ref 0.5–1.4)
CREAT UR-MCNC: 234 MG/DL (ref 23–375)
DIFFERENTIAL METHOD: ABNORMAL
EOSINOPHIL # BLD AUTO: 0.2 K/UL (ref 0–0.5)
EOSINOPHIL NFR BLD: 2.6 % (ref 0–8)
ERYTHROCYTE [DISTWIDTH] IN BLOOD BY AUTOMATED COUNT: 12 % (ref 11.5–14.5)
EST. GFR  (AFRICAN AMERICAN): >60 ML/MIN/1.73 M^2
EST. GFR  (NON AFRICAN AMERICAN): >60 ML/MIN/1.73 M^2
ETHANOL SERPL-MCNC: <10 MG/DL
GLUCOSE SERPL-MCNC: 87 MG/DL (ref 70–110)
GLUCOSE UR QL STRIP: NEGATIVE
HCT VFR BLD AUTO: 51.7 % (ref 40–54)
HGB BLD-MCNC: 16.1 G/DL (ref 14–18)
HGB UR QL STRIP: NEGATIVE
IMM GRANULOCYTES # BLD AUTO: 0.01 K/UL (ref 0–0.04)
IMM GRANULOCYTES NFR BLD AUTO: 0.1 % (ref 0–0.5)
KETONES UR QL STRIP: NEGATIVE
LEUKOCYTE ESTERASE UR QL STRIP: NEGATIVE
LYMPHOCYTES # BLD AUTO: 3 K/UL (ref 1–4.8)
LYMPHOCYTES NFR BLD: 42.6 % (ref 18–48)
MCH RBC QN AUTO: 28.8 PG (ref 27–31)
MCHC RBC AUTO-ENTMCNC: 31.1 G/DL (ref 32–36)
MCV RBC AUTO: 92 FL (ref 82–98)
METHADONE UR QL SCN>300 NG/ML: NEGATIVE
MONOCYTES # BLD AUTO: 0.6 K/UL (ref 0.3–1)
MONOCYTES NFR BLD: 8.4 % (ref 4–15)
NEUTROPHILS # BLD AUTO: 3.2 K/UL (ref 1.8–7.7)
NEUTROPHILS NFR BLD: 45.9 % (ref 38–73)
NITRITE UR QL STRIP: NEGATIVE
NRBC BLD-RTO: 0 /100 WBC
OPIATES UR QL SCN: NEGATIVE
PCP UR QL SCN>25 NG/ML: NEGATIVE
PH UR STRIP: 6 [PH] (ref 5–8)
PLATELET # BLD AUTO: 308 K/UL (ref 150–350)
PMV BLD AUTO: 8.6 FL (ref 9.2–12.9)
POTASSIUM SERPL-SCNC: 4 MMOL/L (ref 3.5–5.1)
PROT SERPL-MCNC: 8.1 G/DL (ref 6–8.4)
PROT UR QL STRIP: NEGATIVE
RBC # BLD AUTO: 5.6 M/UL (ref 4.6–6.2)
SODIUM SERPL-SCNC: 140 MMOL/L (ref 136–145)
SP GR UR STRIP: 1.01 (ref 1–1.03)
TOXICOLOGY INFORMATION: NORMAL
TSH SERPL DL<=0.005 MIU/L-ACNC: 2.84 UIU/ML (ref 0.4–4)
URN SPEC COLLECT METH UR: NORMAL
WBC # BLD AUTO: 7 K/UL (ref 3.9–12.7)

## 2020-04-15 PROCEDURE — 84443 ASSAY THYROID STIM HORMONE: CPT

## 2020-04-15 PROCEDURE — 80053 COMPREHEN METABOLIC PANEL: CPT

## 2020-04-15 PROCEDURE — 99213 OFFICE O/P EST LOW 20 MIN: CPT | Mod: 95,,, | Performed by: NURSE PRACTITIONER

## 2020-04-15 PROCEDURE — 81003 URINALYSIS AUTO W/O SCOPE: CPT | Mod: 59

## 2020-04-15 PROCEDURE — 99284 EMERGENCY DEPT VISIT MOD MDM: CPT | Mod: ,,, | Performed by: EMERGENCY MEDICINE

## 2020-04-15 PROCEDURE — 99284 PR EMERGENCY DEPT VISIT,LEVEL IV: ICD-10-PCS | Mod: ,,, | Performed by: EMERGENCY MEDICINE

## 2020-04-15 PROCEDURE — 80320 DRUG SCREEN QUANTALCOHOLS: CPT

## 2020-04-15 PROCEDURE — 85025 COMPLETE CBC W/AUTO DIFF WBC: CPT

## 2020-04-15 PROCEDURE — 80307 DRUG TEST PRSMV CHEM ANLYZR: CPT

## 2020-04-15 PROCEDURE — 99213 PR OFFICE/OUTPT VISIT, EST, LEVL III, 20-29 MIN: ICD-10-PCS | Mod: 95,,, | Performed by: NURSE PRACTITIONER

## 2020-04-15 PROCEDURE — 80329 ANALGESICS NON-OPIOID 1 OR 2: CPT

## 2020-04-15 PROCEDURE — 99285 EMERGENCY DEPT VISIT HI MDM: CPT

## 2020-04-15 RX ORDER — ACETAMINOPHEN 325 MG/1
650 TABLET ORAL EVERY 6 HOURS PRN
Status: DISCONTINUED | OUTPATIENT
Start: 2020-04-16 | End: 2020-04-16 | Stop reason: HOSPADM

## 2020-04-15 RX ORDER — MAG HYDROX/ALUMINUM HYD/SIMETH 200-200-20
30 SUSPENSION, ORAL (FINAL DOSE FORM) ORAL EVERY 6 HOURS PRN
Status: DISCONTINUED | OUTPATIENT
Start: 2020-04-16 | End: 2020-04-16 | Stop reason: HOSPADM

## 2020-04-15 RX ORDER — HYDROXYZINE PAMOATE 25 MG/1
25 CAPSULE ORAL EVERY 6 HOURS PRN
Qty: 120 CAPSULE | Refills: 3 | Status: ON HOLD | OUTPATIENT
Start: 2020-04-15 | End: 2020-04-23 | Stop reason: HOSPADM

## 2020-04-15 NOTE — PATIENT INSTRUCTIONS
Hydroxyzine capsules or tablets  What is this medicine?  HYDROXYZINE (misael DROX i zeen) is an antihistamine. This medicine is used to treat allergy symptoms. It is also used to treat anxiety and tension. This medicine can be used with other medicines to induce sleep before surgery.  How should I use this medicine?  Take this medicine by mouth with a full glass of water. Follow the directions on the prescription label. You may take this medicine with food or on an empty stomach. Take your medicine at regular intervals. Do not take your medicine more often than directed.  Talk to your pediatrician regarding the use of this medicine in children. Special care may be needed. While this drug may be prescribed for children as young as 6 years of age for selected conditions, precautions do apply.  Patients over 65 years old may have a stronger reaction and need a smaller dose.  What side effects may I notice from receiving this medicine?  Side effects that you should report to your doctor or health care professional as soon as possible:  · fast or irregular heartbeat  · difficulty passing urine  · seizures  · slurred speech or confusion  · tremor  Side effects that usually do not require medical attention (report to your doctor or health care professional if they continue or are bothersome):  · constipation  · drowsiness  · fatigue  · headache  · stomach upset  What may interact with this medicine?  · alcohol  · barbiturate medicines for sleep or seizures  · medicines for colds, allergies  · medicines for depression, anxiety, or emotional disturbances  · medicines for pain  · medicines for sleep  · muscle relaxants  What if I miss a dose?  If you miss a dose, take it as soon as you can. If it is almost time for your next dose, take only that dose. Do not take double or extra doses.  Where should I keep my medicine?  Keep out of the reach of children.  Store at room temperature between 15 and 30 degrees C (59 and 86 degrees  F). Keep container tightly closed. Throw away any unused medicine after the expiration date.  What should I tell my health care provider before I take this medicine?  They need to know if you have any of these conditions:  · any chronic illness  · difficulty passing urine  · glaucoma  · heart disease  · kidney disease  · liver disease  · lung disease  · an unusual or allergic reaction to hydroxyzine, cetirizine, other medicines, foods, dyes, or preservatives  · pregnant or trying to get pregnant  · breast-feeding  What should I watch for while using this medicine?  Tell your doctor or health care professional if your symptoms do not improve.  You may get drowsy or dizzy. Do not drive, use machinery, or do anything that needs mental alertness until you know how this medicine affects you. Do not stand or sit up quickly, especially if you are an older patient. This reduces the risk of dizzy or fainting spells. Alcohol may interfere with the effect of this medicine. Avoid alcoholic drinks.  Your mouth may get dry. Chewing sugarless gum or sucking hard candy, and drinking plenty of water may help. Contact your doctor if the problem does not go away or is severe.  This medicine may cause dry eyes and blurred vision. If you wear contact lenses you may feel some discomfort. Lubricating drops may help. See your eye doctor if the problem does not go away or is severe.  If you are receiving skin tests for allergies, tell your doctor you are using this medicine.  NOTE:This sheet is a summary. It may not cover all possible information. If you have questions about this medicine, talk to your doctor, pharmacist, or health care provider. Copyright© 2017 [employer]        Cariprazine oral capsules  What is this medicine?  CARIPRAZINE (car i PRA zeen) is an antipsychotic. It is used to treat schizophrenia or bipolar disorder. Bipolar disorder is also known as manic-depression.  How should I use this medicine?  Take this medicine  by mouth with a glass of water. Follow the directions on the prescription label. You may take it with or without food. Take your medicine at regular intervals. Do not take it more often than directed. Do not stop taking except on your doctor's advice.  Talk to your pediatrician regarding the use of this medicine in children. Special care may be needed.  What side effects may I notice from receiving this medicine?  Side effects that you should report to your doctor or health care professional as soon as possible:  · allergic reactions like skin rash, itching or hives, swelling of the face, lips, or tongue  · changes in emotions or moods  · confusion  · difficulty swallowing  · feeling faint or lightheaded, falls  · fever or chills, sore throat  · inability to control muscle movements in the face, mouth, hands, arms, or legs  · increased hunger or thirst  · increased urination  · missed or irregular menstrual periods  · problems with balance, talking, walking  · seizures  · stiff muscles  · suicidal thoughts or other mood changes  · unusually weak or tired  Side effects that usually do not require medical attention (Report these to your doctor or health care professional if they continue or are bothersome.):  · constipation  · dizziness  · drowsiness  · nausea, vomiting  · restlessness  · upset stomach  · weight gain  What may interact with this medicine?  Do not take this medicine with any of the following medications:  · metoclopramide  This medicine may also interact with the following medications:  · carbamazepine  · certain medicines for depression, anxiety, or psychotic disturbances  · certain medicines for sleep  · itraconazole  · ketoconazole  · medicines for blood pressure  · rifampin  · Sg's wort  What if I miss a dose?  If you miss a dose, take it as soon as you can. If it is almost time for your next dose, take only that dose. Do not take double or extra doses.  Where should I keep my medicine?  Keep  out of the reach of children.  Store at room temperature between 15 and 30 degrees C (59 and 86 degrees F). Protect from light. Throw away any unused medicine after the expiration date.  What should I tell my health care provider before I take this medicine?  They need to know if you have any of these conditions:  · dementia  · diabetes  · difficulty swallowing  · heart disease  · history of breast cancer  · kidney disease  · liver disease  · low blood counts, like low white cell, platelet, or red cell counts  · low blood pressure  · Parkinson's disease  · seizures  · suicidal thoughts, plans, or attempt; a previous suicide attempt by you or a family member  · an unusual or allergic reaction to cariprazine, other medicines, foods, dyes, or preservatives  · pregnant or trying to get pregnant  · breast-feeding  What should I watch for while using this medicine?  Visit your doctor or health care professional for regular checks on your progress. It may be several weeks before you see the full effects of this medicine. Notify your doctor or health care professional if your symptoms get worse, if you have new symptoms, if you are having an unusual effect from this medicine, or if you feel out of control, very discouraged or think you might harm yourself or others.  Do not suddenly stop taking this medicine. You may need to gradually reduce the dose. Ask your doctor or health care professional for advice.  You may get dizzy or drowsy. Do not drive, use machinery, or do anything that needs mental alertness until you know how this medicine affects you. Do not stand or sit up quickly, especially if you are an older patient. This reduces the risk of dizzy or fainting spells. Alcohol can increase dizziness and drowsiness. Avoid alcoholic drinks.  This medicine may cause dry eyes and blurred vision. If you wear contact lenses you may feel some discomfort. Lubricating drops may help. See your eye doctor if the problem does not go  away or is severe.  This medicine can reduce the response of your body to heat or cold. Dress warm in cold weather and stay hydrated in hot weather. If possible, avoid extreme temperatures like saunas, hot tubs, very hot or cold showers, or activities that can cause dehydration such as vigorous exercise.  Women should inform their doctor if they wish to become pregnant or think they might be pregnant. The effects of this medicine on an unborn child are not known. A registry is available to monitor pregnancy outcomes in pregnant women exposed to this medicine or similar medicines. Talk to your health care professional or pharmacist for more information.  NOTE:This sheet is a summary. It may not cover all possible information. If you have questions about this medicine, talk to your doctor, pharmacist, or health care provider. Copyright© 2017 [employer]

## 2020-04-15 NOTE — PROGRESS NOTES
"Outpatient Psychiatry Follow-Up Visit (MD/NP)    4/15/2020     The patient location is: Home in [place], [place]  The chief complaint leading to consultation is: Psychosis and anxiety   Visit type: audiovisual  Total time spent with patient: 30 minutes  Each patient to whom he or she provides medical services by telemedicine is:  (1) informed of the relationship between the physician and patient and the respective role of any other health care provider with respect to management of the patient; and (2) notified that he or she may decline to receive medical services by telemedicine and may withdraw from such care at any time.      Clinical Status of Patient:  Outpatient (Ambulatory)    Chief Complaint:  Lesia Lawson is a 24 y.o. male who presents today for follow-up of depression.  Met with patient and mother.      Interval History and Content of Current Session:  Interim Events/Subjective Report/Content of Current Session:   Patient described his mood as "better with vraylar" and his affect was slightly anxious. When asked to elaborate about his symptoms he stated.,"I still feel paranoid. A week ago I woke up and my thoughts were bad. I had thoughts that told me to jump off the balcony. I didn't do it. When I hear noises like the washing machine or the drier, I get startled but once I know what It is, I feel better. The voices are better. There is no paranoia for the past 2 day. My attention span is decreased. I am not eating as much as I used with Risperdal and Zyprexa."    He denied AVH currently and the last time he had hallucinations was a week ago. He denied SI/HI. He stated that taking vraylar 1.5 mg po (2 caps) instead of taking vraylar 3 mg at once. When asked to elaborate further he stated, "I felt like I am getting high when I take vraylar 3 mg cap and I prefer taking 2 tabs of vraylar 1.5 mg." He find vistaril 25 mg po am and 50 mg po qhs is very helpful in decreasing anxiety and promoting sleep. Mother " "reported that patient is doing better since his second hospitalization 3/29/2020 and it seems vraylar is working well. We discussed invega and VU as a future option.    Medication Trails    Abilify (was not effective enough)  Zyprexa (was too strong and it made me feel high, did not get rid of hallucination and increased the thoughts)    Risperdal (it made me feel high. It me feel like I am outside myself. It made me slow and it increased my thoughts."      Psychotherapy:  · Target symptoms: anxiety , psychosis  · Why chosen therapy is appropriate versus another modality: relevant to diagnosis, patient responds to this modality, evidence based practice  · Outcome monitoring methods: self-report, observation, feedback from family  · Therapeutic intervention type: insight oriented psychotherapy, behavior modifying psychotherapy, supportive psychotherapy  · Topics discussed/themes: building skills sets for symptom management, symptom recognition  · The patient's response to the intervention is motivated. The patient's progress toward treatment goals is fair.   · Duration of intervention: 10 minutes.    Review of Systems   · PSYCHIATRIC: Pertinant items are noted in the narrative.  · CONSTITUTIONAL: No weight gain or loss.   · MUSCULOSKELETAL: No pain or stiffness of the joints.  · NEUROLOGIC: No weakness, sensory changes, seizures, confusion, memory loss, tremor or other abnormal movements.  · ENDOCRINE: No polydipsia or polyuria.  · INTEGUMENTARY: No rashes or lacerations.  · EYES: No exophthalmos, jaundice or blindness.  · ENT: No dizziness, tinnitus or hearing loss.  · RESPIRATORY: No shortness of breath.  · CARDIOVASCULAR: No tachycardia or chest pain.  · GASTROINTESTINAL: No nausea, vomiting, pain, constipation or diarrhea.  · GENITOURINARY: No frequency, dysuria or sexual dysfunction.  · HEMATOLOGIC/LYMPHATIC: No excessive bleeding, prolonged or excessive bleeding after dental " "extraction/injury.  · ALLERGIC/IMMUNOLOGIC: No allergic response to materials, foods or animals at this time.    Past Medical, Family and Social History: The patient's past medical, family and social history have been reviewed and updated as appropriate within the electronic medical record - see encounter notes.    Compliance: yes    Side effects: None    Risk Parameters:  Patient reports no suicidal ideation  Patient reports no homicidal ideation  Patient reports no self-injurious behavior  Patient reports no violent behavior    Exam (detailed: at least 9 elements; comprehensive: all 15 elements)   Constitutional  Vitals:  Most recent vital signs, dated greater than 90 days prior to this appointment, were reviewed.   There were no vitals filed for this visit.     General:  unremarkable, age appropriate     Musculoskeletal  Muscle Strength/Tone:  no dystonia, no tremor, no tic   Gait & Station:  non-ataxic     Psychiatric  Speech:  no latency; no press   Mood & Affect:  "better"  congruent and appropriate   Thought Process:  normal and logical   Associations:  intact   Thought Content:  normal, no suicidality, no homicidality, delusions, or paranoia   Insight:  intact, has awareness of illness   Judgement: behavior is adequate to circumstances   Orientation:  grossly intact   Memory: intact for content of interview   Language: grossly intact   Attention Span & Concentration:  able to focus   Fund of Knowledge:  intact and appropriate to age and level of education     Assessment and Diagnosis   Status/Progress: Based on the examination today, the patient's problem(s) is/are improved.  New problems have not been presented today.   Co-morbidities, Diagnostic uncertainty and Lack of compliance are not complicating management of the primary condition.  There are no active rule-out diagnoses for this patient at this time.     General Impression: Patient meets the criteria for schizophreniform and generalized anxiety " disorder. Patient was hospitalized in 3/29/2020 and was discharged with vraylar 3 mg po daily and vistaril 25 mg po prn q6h.       ICD-10-CM ICD-9-CM   1. 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 ANGELIC (generalized anxiety disorder) F41.1 300.02       Intervention/Counseling/Treatment Plan     · Medication Management:   ? Discontinue Zyprexa to 5 mg po qam and 15 mg po qhs (not effective)  ? Continue vraylar 1.5 mg (take 2 caps daily) for psychosis  ? Continue vistaril 25 mg po qm and 50 mg po qhs  · Labs: reviewed most recent labs. New labs due 3/2021  · The treatment plan and follow up plan were reviewed with the patient.  · Discussed with patient informed consent, risks vs. benefits, alternative treatments, side effect profile and the inherent unpredictability of individual responses to these treatments. The patient expresses understanding of the above and displays the capacity to agree with this current plan and had no other questions.  · Encouraged Patient to keep future appointments.   · Take medications as prescribed and abstain from substance abuse.   · In the event of an emergency patient was advised to go to the emergency room.  · Referral for further treatment to social work team for psychotherapy    Return to Clinic: 1 month or earlier as needed    DOREEN Vance-BC

## 2020-04-16 PROBLEM — F32.A DEPRESSION WITH SUICIDAL IDEATION: Status: ACTIVE | Noted: 2020-04-16

## 2020-04-16 PROBLEM — R45.851 DEPRESSION WITH SUICIDAL IDEATION: Status: ACTIVE | Noted: 2020-04-16

## 2020-04-16 PROBLEM — F25.1: Status: ACTIVE | Noted: 2020-04-16

## 2020-04-16 NOTE — ED NOTES
The patient departed the [facility] @ 0217 per [facility] Transportation via ambulatory. One belongings bag given to [facility] staff. Contacted Leslie @ [facility]([phone]) to notify of departure.

## 2020-04-16 NOTE — ED NOTES
Lying prone in bed, adjusting position in bed. Maintained on direct visual observation. Awaiting placement.

## 2020-04-16 NOTE — ED NOTES
"The patient was escorted to the [facility] by the [facility] PCT & two hospital security officers & arrived to the [facility] @ 2311. He is ambulatory w/ a steady gait. He was checked for contraband, none found. His affect is blunted, mood is engaging upon approach otherwise quiet. He presents on a PEC written by Dr. Zamorano on 04/15/2020 @ 2250. He states that he presented to the ED for " thoughts out of the normal." He also endorses VH of a" knife, a clown". He states that two days ago he had a thought of jumping off of his apartment [place]. He c/o decrease sleep, sleeping six hours a night., decrease appetite both since a medication change. He then states, " I doing better." He states that he wanted the help of a professional so he presented to the ER. He is informed of the plan of care & states that he prefers [facility] as he had to pay a copay @ the last hospital that he was transferred to. He is placed on direct visual observation.  "

## 2020-04-16 NOTE — ED PROVIDER NOTES
Chief complaint:  Psychiatric Evaluation (Pt c/o of SI and hallucinations. States he has been dealing with these issues since march and been having his medications adjusted.)      HPI:  Dmaleisaus DEANNA Lawson is a 24 y.o. male presenting with bad thoughts that began a few days ago.  Patient states this is the 3rd time he has had come to the hospital in the past few weeks since having psychosis which began in March.  He believes he needs a medication adjustment and thinks he needs to be admitted to the hospital.  He does not believe his medication is working.  Two days ago he had thoughts about jumping off his balcony.  He is worried he can not control his thoughts.  On his last admission he was having homicidal thoughts but denies these this time.  He also reports having hallucination a few days ago.    ROS: As per HPI and below:  REVIEW OF SYSTEMS:  GENERAL: No chills.  No fever.  No malaise.  SKIN: No lesions.  Denies rash.  HEENT: Denies URI symptoms.  No headache.  CHEST: No cough.  No dyspnea.  CARDIOVASCULAR: No chest pain.  No reduced exercise tolerance.  ABDOMEN:  Complains of decreased appetite.  Believes the medication is doing this.  Denies any weight loss.    URINARY: No abnormal urination.  HEMATOLOGIC: No anemia.  Denies excessive bleeding.  PERIPHERAL VASCULAR: No edema.  No cramps.  PSYCHIATRIC:  See HPI  NEUROLOGIC: No paresthesias.  No numbness.  No focal weakness.  Denies syncope.    Review of patient's allergies indicates:  No Known Allergies    No current facility-administered medications on file prior to encounter.      Current Outpatient Medications on File Prior to Encounter   Medication Sig Dispense Refill    cariprazine (VRAYLAR) 1.5 mg Cap Take two caps in the morning 60 capsule 1    cariprazine (VRAYLAR) 3 mg Cap Take 1 capsule (3 mg total) by mouth once daily. 30 capsule 0    cariprazine (VRAYLAR) 3 mg Cap Take 1 capsule (3 mg total) by mouth once daily. 30 capsule 0    hydrOXYzine  pamoate (VISTARIL) 25 MG Cap Take 1 capsule (25 mg total) by mouth every 6 (six) hours as needed. 120 capsule 3    [DISCONTINUED] hydrOXYzine pamoate (VISTARIL) 25 MG Cap TAKE ONE CAPSULE BY MOUTH DAILY AS NEEDED FOR ANXIETY 30 capsule 0       PMH:  As per HPI and below:  Past Medical History:   Diagnosis Date    Depression      Past Medical History Pertinent Negatives:   Diagnosis Date Noted    Asthma 03/28/2020    CHF (congestive heart failure) 03/28/2020    COPD (chronic obstructive pulmonary disease) 03/28/2020    Diabetes mellitus 03/28/2020     No past surgical history on file.    Social History     Socioeconomic History    Marital status: Single     Spouse name: Not on file    Number of children: Not on file    Years of education: Not on file    Highest education level: Not on file   Occupational History    Not on file   Social Needs    Financial resource strain: Not on file    Food insecurity:     Worry: Not on file     Inability: Not on file    Transportation needs:     Medical: Not on file     Non-medical: Not on file   Tobacco Use    Smoking status: Never Smoker    Smokeless tobacco: Never Used   Substance and Sexual Activity    Alcohol use: No    Drug use: No    Sexual activity: Never   Lifestyle    Physical activity:     Days per week: Not on file     Minutes per session: Not on file    Stress: Not on file   Relationships    Social connections:     Talks on phone: Not on file     Gets together: Not on file     Attends Mu-ism service: Not on file     Active member of club or organization: Not on file     Attends meetings of clubs or organizations: Not on file     Relationship status: Not on file   Other Topics Concern    Patient feels they ought to cut down on drinking/drug use No    Patient annoyed by others criticizing their drinking/drug use No    Patient has felt bad or guilty about drinking/drug use No    Patient has had a drink/used drugs as an eye opener in the AM No    Social History Narrative    Lives with mom, sister, brother, sister; dad; no pets       Family History   Problem Relation Age of Onset    Mental illness Maternal Uncle         schizophrenia    Cancer Maternal Grandmother         colon    Diabetes Maternal Grandmother         adult    Hyperlipidemia Maternal Grandmother     Hypertension Maternal Grandmother     No Known Problems Mother     Hypertension Father     Asthma Neg Hx     Birth defects Neg Hx     Chromosomal disorder Neg Hx     Early death Neg Hx     Heart disease Neg Hx     Seizures Neg Hx     Thyroid disease Neg Hx     Other Neg Hx        Physical Exam:    Vitals:    04/15/20 2059   BP: 128/80   Pulse: 88   Resp: 16   Temp: 98.9 °F (37.2 °C)     APPEARANCE: Alert and oriented, and in no acute distress.  SKIN: Warm and dry. No obvious rashes or lesions noted.  HEENT: PERRL.  EOMI.  Airway intact.  No tonsillar enlargement. No pharyngeal erythema or exudate.  Uvula midline with no soft palate swelling.  NECK: No JVD.  Supple. Trachea midline.  CHEST: Lungs clear to auscultation; no rales, rhonchi, or wheezing noted. Breath sounds equal bilaterally.  CARDIOVASCULAR: Normal S1, S2. No murmurs, gallops, or rubs. Rhythm regular with no ectopy noted.  MUSCULOSKELETAL:  No joint tenderness or swelling.  No soft tissue swelling or tenderness.  No limited range of motion.  BACK:  No flank or low back tenderness, normal curvature.  NEUROLOGIC: CN II-XII grossly intact.   Sensory: Intact to light touch distally.  Motor: 5/5 strength major flexors/extensors.  MENTAL STATUS: Patient alert, oriented x 3, conversant.  PSYCHIATRIC:  Flat affect.  Pressured speech.  Labs Reviewed   CBC W/ AUTO DIFFERENTIAL - Abnormal; Notable for the following components:       Result Value    Mean Corpuscular Hemoglobin Conc 31.1 (*)     MPV 8.6 (*)     All other components within normal limits   ACETAMINOPHEN LEVEL - Abnormal; Notable for the following components:     Acetaminophen (Tylenol), Serum <3.0 (*)     All other components within normal limits   COMPREHENSIVE METABOLIC PANEL   TSH   URINALYSIS, REFLEX TO URINE CULTURE    Narrative:     Preferred Collection Type->Urine, Clean Catch   ALCOHOL,MEDICAL (ETHANOL)   DRUG SCREEN PANEL, URINE EMERGENCY       Medications   acetaminophen tablet 650 mg (has no administration in time range)   aluminum-magnesium hydroxide-simethicone 200-200-20 mg/5 mL suspension 30 mL (has no administration in time range)       MDM:    24 y.o. male with recent diagnosis of schizophreniform disorder.  He presents complaining of suicidal thoughts and feeling like he needs a medication adjustment.  I will pec for SI and grave disability.  Will check electrolytes and chemistry for the possibility of electrolyte abnormality and dehydration given his poor appetite.  Once medically clear will place in a psychiatric facility.      ED COURSE AND NOTES:    Medical Record Reviewed (I have decided to obtain this patient's electronic medical record):  Patient was recently admitted to [facility], discharged on April 5th.    11:09 PM  Patient is medically clear for transfer to a psychiatric facility.      Diagnoses:    Final diagnoses:  [R45.851] Suicidal ideation (Primary)           Maxine Martinez MD  04/15/20 8582

## 2020-04-16 NOTE — ED TRIAGE NOTES
Candyus DEANNA Lawson, a 24 y.o. male presents to the ED w/ complaint of SI. Pt states that he thinks his meds are not working and would like to be re-evaluated for it. Pt states that he does have thoughts to harm himself.     Triage note:  Chief Complaint   Patient presents with    Psychiatric Evaluation     Pt c/o of SI and hallucinations. States he has been dealing with these issues since march and been having his medications adjusted.     Review of patient's allergies indicates:  No Known Allergies  Past Medical History:   Diagnosis Date    Depression        Patient identifiers verified and correct for Lesia Lawson    LOC: The patient is awake, alert and aware of environment with an appropriate affect, the patient is oriented x 3 and speaking appropriately.   APPEARANCE: Patient appears comfortable and in no acute distress, patient is clean and well groomed.  SKIN: The skin is warm and dry, color consistent with ethnicity, patient has normal skin turgor and moist mucus membranes, skin intact, no breakdown or bruising noted.   MUSCULOSKELETAL: Patient moving all extremities spontaneously, no swelling noted.  RESPIRATORY: Airway is open and patent, respirations are spontaneous, patient has a normal effort and rate, no accessory muscle use noted, pt placed on continuous pulse ox with O2 sats noted at 97% on room air.  CARDIAC: Pt placed on cardiac monitor. Patient has a normal rate and regular rhythm, no edema noted, capillary refill < 3 seconds.   GASTRO: Soft and non tender to palpation, no distention noted, normoactive bowel sounds present in all four quadrants. Pt states bowel movements have been regular.  : Pt denies any pain or frequency with urination.  NEURO: Pt opens eyes spontaneously, behavior appropriate to situation, follows commands, facial expression symmetrical, bilateral hand grasp equal and even, purposeful motor response noted, normal sensation in all extremities when touched with a  finger.

## 2020-04-16 NOTE — ED NOTES
Report called to [facility] @ [phone], spoke w/ Leslie. Telephone provided as states that wants to notify mother of his transfer to [facility]. Maintained on direct visual observation.

## 2020-05-06 ENCOUNTER — OFFICE VISIT (OUTPATIENT)
Dept: INTERNAL MEDICINE | Facility: CLINIC | Age: 25
End: 2020-05-06
Payer: COMMERCIAL

## 2020-05-06 DIAGNOSIS — H61.22 IMPACTED CERUMEN OF LEFT EAR: Primary | ICD-10-CM

## 2020-05-06 PROCEDURE — 99212 OFFICE O/P EST SF 10 MIN: CPT | Mod: 95,,, | Performed by: INTERNAL MEDICINE

## 2020-05-06 PROCEDURE — 99212 PR OFFICE/OUTPT VISIT, EST, LEVL II, 10-19 MIN: ICD-10-PCS | Mod: 95,,, | Performed by: INTERNAL MEDICINE

## 2020-05-06 NOTE — PROGRESS NOTES
"Established Patient - Audio Only Telehealth Visit     The patient location is: [place]  The chief complaint leading to consultation is: waxy buildup in ear  Visit type: Virtual visit with audio only (telephone)  Total time spent with patient: 10 minutes       The reason for the audio only service rather than synchronous audio and video virtual visit was related to technical difficulties or patient preference/necessity.     Each patient to whom I provide medical services by telemedicine is:  (1) informed of the relationship between the physician and patient and the respective role of any other health care provider with respect to management of the patient; and (2) notified that they may decline to receive medical services by telemedicine and may withdraw from such care at any time. Patient verbally consented to receive this service via voice-only telephone call.        Patient ID: Lesia Lawson is a 24 y.o. male.    Chief Complaint: No chief complaint on file.     KEN Graf is a 24 y.o. male who presents today with chief complaint of waxy buildup in ear. This is located in the left ear. It feels "stopped up". It is not associated with pain in the ear, hearing loss, runny nose, sinus pressure/pain. He has been using Qtips and peroxide in the ear but with no relief.     Discussed his recent hospitalizations. Says he was having some stressors related to the COVID pandemic. He is currently going to [place] outpatient [facility] program. They are assisting him with his prescriptions and he is doing well now.      Review of Systems   HENT: Negative for ear pain, hearing loss, rhinorrhea, sinus pressure and sinus pain.         Left ear feels "stopped up"   All other systems reviewed and are negative.      Unable to visualize patient for physical exam.  Objective:   There were no vitals filed for this visit.     Physical Exam    Assessment:       1. Impacted cerumen of left ear Active       Plan:         Impacted " cerumen of left ear  Comments:  Recommend using over-the-counter Debrox.  If no relief with that, recommend he come in to the clinic for examination of the ear. Advised to stop using Qtips        RTC PRN    Warning signs discussed, patient to call with any further issues or worsening of symptoms.       Parts of the above note were dictated using a voice dictation software. Please excuse any grammatical or typographical errors.                            This service was not originating from a related E/M service provided within the previous 7 days nor will  to an E/M service or procedure within the next 24 hours or my soonest available appointment.  Prevailing standard of care was able to be met in this audio-only visit.

## 2020-05-06 NOTE — PATIENT INSTRUCTIONS
Use over the counter Debrox Ear Wax Removal kit for removal of ear wax. Please do not use Qtips.

## 2020-05-18 RX ORDER — BENZTROPINE MESYLATE 1 MG/1
1 TABLET ORAL 2 TIMES DAILY
Qty: 60 TABLET | Refills: 2 | Status: SHIPPED | OUTPATIENT
Start: 2020-05-18 | End: 2020-07-08 | Stop reason: SDUPTHER

## 2020-05-18 RX ORDER — BENZTROPINE MESYLATE 1 MG/1
1 TABLET ORAL 2 TIMES DAILY
Qty: 60 TABLET | Refills: 2 | Status: SHIPPED | OUTPATIENT
Start: 2020-05-18 | End: 2020-05-18 | Stop reason: SDUPTHER

## 2020-05-18 RX ORDER — MIRTAZAPINE 30 MG/1
30 TABLET, FILM COATED ORAL NIGHTLY
Qty: 30 TABLET | Refills: 2 | Status: SHIPPED | OUTPATIENT
Start: 2020-05-18 | End: 2020-07-08 | Stop reason: SDUPTHER

## 2020-05-18 RX ORDER — MIRTAZAPINE 30 MG/1
30 TABLET, FILM COATED ORAL NIGHTLY
Qty: 30 TABLET | Refills: 2 | Status: SHIPPED | OUTPATIENT
Start: 2020-05-18 | End: 2020-06-17

## 2020-05-27 ENCOUNTER — PATIENT MESSAGE (OUTPATIENT)
Dept: PSYCHIATRY | Facility: CLINIC | Age: 25
End: 2020-05-27

## 2020-05-28 ENCOUNTER — OFFICE VISIT (OUTPATIENT)
Dept: PSYCHIATRY | Facility: CLINIC | Age: 25
End: 2020-05-28
Payer: COMMERCIAL

## 2020-05-28 DIAGNOSIS — F33.1 MDD (MAJOR DEPRESSIVE DISORDER), RECURRENT EPISODE, MODERATE: ICD-10-CM

## 2020-05-28 DIAGNOSIS — F41.1 GAD (GENERALIZED ANXIETY DISORDER): ICD-10-CM

## 2020-05-28 DIAGNOSIS — F20.81 SCHIZOPHRENIFORM DISORDER: Primary | ICD-10-CM

## 2020-05-28 PROCEDURE — 99213 PR OFFICE/OUTPT VISIT, EST, LEVL III, 20-29 MIN: ICD-10-PCS | Mod: 95,,, | Performed by: NURSE PRACTITIONER

## 2020-05-28 PROCEDURE — 99213 OFFICE O/P EST LOW 20 MIN: CPT | Mod: 95,,, | Performed by: NURSE PRACTITIONER

## 2020-05-28 NOTE — PROGRESS NOTES
"Outpatient Psychiatry Follow-Up Visit (MD/NP)    5/28/2020     The patient location is: Home in [place], [place]  The chief complaint leading to consultation is: Psychosis and anxiety   Visit type: audiovisual    Face to Face time with patient: 20 minutes  30 minutes of total time spent on the encounter, which includes face to face time and non-face to face time preparing to see the patient (eg, review of tests), Obtaining and/or reviewing separately obtained history, Documenting clinical information in the electronic or other health record, Independently interpreting results (not separately reported) and communicating results to the patient/family/caregiver, or Care coordination (not separately reported).         Each patient to whom he or she provides medical services by telemedicine is:  (1) informed of the relationship between the physician and patient and the respective role of any other health care provider with respect to management of the patient; and (2) notified that he or she may decline to receive medical services by telemedicine and may withdraw from such care at any time.    Clinical Status of Patient:  Outpatient (Ambulatory)    Chief Complaint:  Lesia Lawson is a 24 y.o. male who presents today for follow-up of depression, anxiety and psychosis  Met with patient.      Interval History and Content of Current Session:  Interim Events/Subjective Report/Content of Current Session:   Doing better since his last visit. Patient described his mood as "better. Remeron has been helping me but it made me sleep too much at the beginning and it effected my daily activity. The last couple of days I have been having more enery" and his affect was euthymic. We discussed the possibility of his body adapting to remeon 30 mg po qhs now. We also discussed if patient starts to feel too sleepy again in the morning we can reduce remeron to 15 mg po qhs. Patient stated that he wants to continue remeron 30 mg po qhs and he " "will let me know if being too sleepy becomes problematic. He stated that he is attending group therapy 3 times a week with [facility] and finds it helpful. When questioned about auditory hallucinations stated, "the voices are very minor and I only hear it while I am sleeping. I go without hearing a voice for a week." He denied having paranoia or visual hallucinations. He reported improved anxiety and described it as "minor" and finds it manageable. He plans to start online criminal justice classes at the  [employer].  He denied SI/HI. He is taking vraylar 3 mg po daily and remeron 30 mg qhs and no longer taking vistaril. He denied any side effects to his current medication regimen. He denied any new medical/physical/Medication at this time. He is stable.     Medication Trails    Abilify (was not effective enough)  Zyprexa (was too strong and it made me feel high, did not get rid of hallucination and increased the thoughts)    Risperdal (it made me feel high. It me feel like I am outside myself. It made me slow and it increased my thoughts."  Vistaril 25-50mg po qhs (was helpful but does not need it while taking remeron)      Psychotherapy:  · Target symptoms: anxiety , psychosis  · Why chosen therapy is appropriate versus another modality: relevant to diagnosis, patient responds to this modality, evidence based practice  · Outcome monitoring methods: self-report, observation, feedback from family  · Therapeutic intervention type: insight oriented psychotherapy, behavior modifying psychotherapy, supportive psychotherapy  · Topics discussed/themes: building skills sets for symptom management, symptom recognition  · The patient's response to the intervention is motivated. The patient's progress toward treatment goals is fair.   · Duration of intervention: 10 minutes.    Review of Systems   · PSYCHIATRIC: Pertinant items are noted in the narrative.  · CONSTITUTIONAL: No weight gain or loss. " "  · MUSCULOSKELETAL: No pain or stiffness of the joints.  · NEUROLOGIC: No weakness, sensory changes, seizures, confusion, memory loss, tremor or other abnormal movements.  · ENDOCRINE: No polydipsia or polyuria.  · INTEGUMENTARY: No rashes or lacerations.  · EYES: No exophthalmos, jaundice or blindness.  · ENT: No dizziness, tinnitus or hearing loss.  · RESPIRATORY: No shortness of breath.  · CARDIOVASCULAR: No tachycardia or chest pain.  · GASTROINTESTINAL: No nausea, vomiting, pain, constipation or diarrhea.  · GENITOURINARY: No frequency, dysuria or sexual dysfunction.  · HEMATOLOGIC/LYMPHATIC: No excessive bleeding, prolonged or excessive bleeding after dental extraction/injury.  · ALLERGIC/IMMUNOLOGIC: No allergic response to materials, foods or animals at this time.    Past Medical, Family and Social History: The patient's past medical, family and social history have been reviewed and updated as appropriate within the electronic medical record - see encounter notes.    Compliance: yes    Side effects: None    Risk Parameters:  Patient reports no suicidal ideation  Patient reports no homicidal ideation  Patient reports no self-injurious behavior  Patient reports no violent behavior    Exam (detailed: at least 9 elements; comprehensive: all 15 elements)   Constitutional  Vitals:  Most recent vital signs, dated greater than 90 days prior to this appointment, were reviewed.   There were no vitals filed for this visit.     General:  unremarkable, age appropriate     Musculoskeletal  Muscle Strength/Tone:  no dystonia, no tremor, no tic   Gait & Station:  non-ataxic     Psychiatric  Speech:  no latency; no press   Mood & Affect:  "better"  congruent and appropriate   Thought Process:  normal and logical   Associations:  intact   Thought Content:  normal, no suicidality, no homicidality, delusions, or paranoia   Insight:  intact, has awareness of illness   Judgement: behavior is adequate to circumstances "   Orientation:  grossly intact   Memory: intact for content of interview   Language: grossly intact   Attention Span & Concentration:  able to focus   Fund of Knowledge:  intact and appropriate to age and level of education     Assessment and Diagnosis   Status/Progress: Based on the examination today, the patient's problem(s) is/are improved.  New problems have not been presented today.   Co-morbidities, Diagnostic uncertainty and Lack of compliance are not complicating management of the primary condition.  There are no active rule-out diagnoses for this patient at this time.     General Impression: Patient meets the criteria for schizophreniform and generalized anxiety disorder. Patient was hospitalized in 3/29/2020 and was discharged with vraylar 3 mg po daily and vistaril 25 mg po prn q6h.       ICD-10-CM ICD-9-CM   1. 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 ANGELIC (generalized anxiety disorder) F41.1 300.02   3. MDD (major depressive disorder), recurrent episode, moderate F33.1 296.32       Intervention/Counseling/Treatment Plan     · Medication Management:   ? Discontinue Zyprexa to 5 mg po qam and 15 mg po qhs (not effective)  ? Continue vraylar 3  for psychosis  ? Discontinue vistaril 25 mg po qm and 50 mg po qhs (does not need it)  ? Continue remeron 30 mg po qhs for depression, anxiety and sleep  · Labs: reviewed most recent labs. New labs due 3/2021  · The treatment plan and follow up plan were reviewed with the patient.  · Discussed with patient informed consent, risks vs. benefits, alternative treatments, side effect profile and the inherent unpredictability of individual responses to these treatments. The patient expresses understanding of the above and displays the capacity to agree with this current plan and had no other questions.  · Encouraged Patient to keep future appointments.   · Take medications as prescribed and abstain from substance abuse.   · In the event of an emergency patient was  advised to go to the emergency room.  · Referral for further treatment to social work team for psychotherapy    Return to Clinic: 1 month or earlier as needed    DOREEN Vance-BC

## 2020-05-28 NOTE — PATIENT INSTRUCTIONS
Mirtazapine tablets  What is this medicine?  MIRTAZAPINE (melisa FAB a peen) is used to treat depression.  How should I use this medicine?  Take this medicine by mouth with a glass of water. Follow the directions on the prescription label. Take your medicine at regular intervals. Do not take your medicine more often than directed. Do not stop taking this medicine suddenly except upon the advice of your doctor. Stopping this medicine too quickly may cause serious side effects or your condition may worsen.  A special MedGuide will be given to you by the pharmacist with each prescription and refill. Be sure to read this information carefully each time.  Talk to your pediatrician regarding the use of this medicine in children. Special care may be needed.  What side effects may I notice from receiving this medicine?  Side effects that you should report to your doctor or health care professional as soon as possible:  · allergic reactions like skin rash, itching or hives, swelling of the face, lips, or tongue  · breathing problems  · confusion  · fever, sore throat, or mouth ulcers or blisters  · flu like symptoms including fever, chills, cough, muscle or joint aches and pains  · stomach pain with nausea and/or vomiting  · suicidal thoughts or other mood changes  · swelling of the hands or feet  · unusual bleeding or bruising  · unusually weak or tired  · vomiting  Side effects that usually do not require medical attention (report to your doctor or health care professional if they continue or are bothersome):  · constipation  · increased appetite  · weight gain  What may interact with this medicine?  Do not take this medicine with any of the following medications:  · linezolid  · MAOIs like Carbex, Eldepryl, Marplan, Nardil, and Parnate  · methylene blue (injected into a vein)  This medicine may also interact with the following medications:  · alcohol  · antiviral medicines for HIV or AIDS  · certain medicines that treat or  prevent blood clots like warfarin  · certain medicines for depression, anxiety, or psychotic disturbances  · certain medicines for fungal infections like ketoconazole and itraconazole  · certain medicines for migraine headache like almotriptan, eletriptan, frovatriptan, naratriptan, rizatriptan, sumatriptan, zolmitriptan  · certain medicines for seizures like carbamazepine or phenytoin  · certain medicines for sleep  · cimetidine  · erythromycin  · fentanyl  · lithium  · medicines for blood pressure  · nefazodone  · rasagiline  · rifampin  · supplements like [employer]'s wort, kava kava, valerian  · tramadol  · tryptophan  What if I miss a dose?  If you miss a dose, take it as soon as you can. If it is almost time for your next dose, take only that dose. Do not take double or extra doses.  Where should I keep my medicine?  Keep out of the reach of children.  Store at room temperature between 15 and 30 degrees C (59 and 86 degrees F) Protect from light and moisture. Throw away any unused medicine after the expiration date.  What should I tell my health care provider before I take this medicine?  They need to know if you have any of these conditions:  · bipolar disorder  · glaucoma  · kidney disease  · liver disease  · suicidal thoughts  · an unusual or allergic reaction to mirtazapine, other medicines, foods, dyes, or preservatives  · pregnant or trying to get pregnant  · breast-feeding  What should I watch for while using this medicine?  Tell your doctor if your symptoms do not get better or if they get worse. Visit your doctor or health care professional for regular checks on your progress. Because it may take several weeks to see the full effects of this medicine, it is important to continue your treatment as prescribed by your doctor.  Patients and their families should watch out for new or worsening thoughts of suicide or depression. Also watch out for sudden changes in feelings such as feeling anxious, agitated,  panicky, irritable, hostile, aggressive, impulsive, severely restless, overly excited and hyperactive, or not being able to sleep. If this happens, especially at the beginning of treatment or after a change in dose, call your health care professional.  You may get drowsy or dizzy. Do not drive, use machinery, or do anything that needs mental alertness until you know how this medicine affects you. Do not stand or sit up quickly, especially if you are an older patient. This reduces the risk of dizzy or fainting spells. Alcohol may interfere with the effect of this medicine. Avoid alcoholic drinks.  This medicine may cause dry eyes and blurred vision. If you wear contact lenses you may feel some discomfort. Lubricating drops may help. See your eye doctor if the problem does not go away or is severe.  Your mouth may get dry. Chewing sugarless gum or sucking hard candy, and drinking plenty of water may help. Contact your doctor if the problem does not go away or is severe.  NOTE:This sheet is a summary. It may not cover all possible information. If you have questions about this medicine, talk to your doctor, pharmacist, or health care provider. Copyright© 2017 [employer]        Cariprazine oral capsules  What is this medicine?  CARIPRAZINE (car i PRA zeen) is an antipsychotic. It is used to treat schizophrenia or bipolar disorder. Bipolar disorder is also known as manic-depression.  How should I use this medicine?  Take this medicine by mouth with a glass of water. Follow the directions on the prescription label. You may take it with or without food. Take your medicine at regular intervals. Do not take it more often than directed. Do not stop taking except on your doctor's advice.  Talk to your pediatrician regarding the use of this medicine in children. Special care may be needed.  What side effects may I notice from receiving this medicine?  Side effects that you should report to your doctor or health care  professional as soon as possible:  · allergic reactions like skin rash, itching or hives, swelling of the face, lips, or tongue  · changes in emotions or moods  · confusion  · difficulty swallowing  · feeling faint or lightheaded, falls  · fever or chills, sore throat  · inability to control muscle movements in the face, mouth, hands, arms, or legs  · increased hunger or thirst  · increased urination  · missed or irregular menstrual periods  · problems with balance, talking, walking  · seizures  · stiff muscles  · suicidal thoughts or other mood changes  · unusually weak or tired  Side effects that usually do not require medical attention (Report these to your doctor or health care professional if they continue or are bothersome.):  · constipation  · dizziness  · drowsiness  · nausea, vomiting  · restlessness  · upset stomach  · weight gain  What may interact with this medicine?  Do not take this medicine with any of the following medications:  · metoclopramide  This medicine may also interact with the following medications:  · carbamazepine  · certain medicines for depression, anxiety, or psychotic disturbances  · certain medicines for sleep  · itraconazole  · ketoconazole  · medicines for blood pressure  · rifampin  · Sg's wort  What if I miss a dose?  If you miss a dose, take it as soon as you can. If it is almost time for your next dose, take only that dose. Do not take double or extra doses.  Where should I keep my medicine?  Keep out of the reach of children.  Store at room temperature between 15 and 30 degrees C (59 and 86 degrees F). Protect from light. Throw away any unused medicine after the expiration date.  What should I tell my health care provider before I take this medicine?  They need to know if you have any of these conditions:  · dementia  · diabetes  · difficulty swallowing  · heart disease  · history of breast cancer  · kidney disease  · liver disease  · low blood counts, like low white  cell, platelet, or red cell counts  · low blood pressure  · Parkinson's disease  · seizures  · suicidal thoughts, plans, or attempt; a previous suicide attempt by you or a family member  · an unusual or allergic reaction to cariprazine, other medicines, foods, dyes, or preservatives  · pregnant or trying to get pregnant  · breast-feeding  What should I watch for while using this medicine?  Visit your doctor or health care professional for regular checks on your progress. It may be several weeks before you see the full effects of this medicine. Notify your doctor or health care professional if your symptoms get worse, if you have new symptoms, if you are having an unusual effect from this medicine, or if you feel out of control, very discouraged or think you might harm yourself or others.  Do not suddenly stop taking this medicine. You may need to gradually reduce the dose. Ask your doctor or health care professional for advice.  You may get dizzy or drowsy. Do not drive, use machinery, or do anything that needs mental alertness until you know how this medicine affects you. Do not stand or sit up quickly, especially if you are an older patient. This reduces the risk of dizzy or fainting spells. Alcohol can increase dizziness and drowsiness. Avoid alcoholic drinks.  This medicine may cause dry eyes and blurred vision. If you wear contact lenses you may feel some discomfort. Lubricating drops may help. See your eye doctor if the problem does not go away or is severe.  This medicine can reduce the response of your body to heat or cold. Dress warm in cold weather and stay hydrated in hot weather. If possible, avoid extreme temperatures like saunas, hot tubs, very hot or cold showers, or activities that can cause dehydration such as vigorous exercise.  Women should inform their doctor if they wish to become pregnant or think they might be pregnant. The effects of this medicine on an unborn child are not known. A registry  is available to monitor pregnancy outcomes in pregnant women exposed to this medicine or similar medicines. Talk to your health care professional or pharmacist for more information.  NOTE:This sheet is a summary. It may not cover all possible information. If you have questions about this medicine, talk to your doctor, pharmacist, or health care provider. Copyright© 2017 [employer]

## 2020-06-18 ENCOUNTER — PATIENT MESSAGE (OUTPATIENT)
Dept: PSYCHIATRY | Facility: CLINIC | Age: 25
End: 2020-06-18

## 2020-06-19 ENCOUNTER — TELEPHONE (OUTPATIENT)
Dept: PSYCHIATRY | Facility: CLINIC | Age: 25
End: 2020-06-19

## 2020-06-19 RX ORDER — BUPROPION HYDROCHLORIDE 150 MG/1
150 TABLET ORAL DAILY
Qty: 30 TABLET | Refills: 1 | Status: SHIPPED | OUTPATIENT
Start: 2020-06-19 | End: 2020-08-11

## 2020-06-19 NOTE — PROGRESS NOTES
Spoke with patient and he stated he was feeling depressed despite increasing mirtazapine to 45 mg po nightly. He denied feeling anxious. He reported good sleep and appetite. Informed patient that it takes few weeks to feel the full the therapeutic benefits of mirtazapine. Patient requested adding another medication to help improve mood, energy and motivation. We discussed Wellbutrin  mg po daily .Went over the risks, benefits, side effects and alternatives of taking and he agreed to take it. He denied SI/HI/AVH and no paranoia or delusions noted or reported. He denied any side effects to his current medication regimen. Provided patient with supportive therapy and recommended psychotherapy for additional support. Will continue to monitor patient's symptoms and progress.

## 2020-06-19 NOTE — TELEPHONE ENCOUNTER
Attempted to contact patient after staff received message to contact him. No answer. Left voice message to call psychiatry clinic.

## 2020-07-08 ENCOUNTER — OFFICE VISIT (OUTPATIENT)
Dept: PSYCHIATRY | Facility: CLINIC | Age: 25
End: 2020-07-08
Payer: COMMERCIAL

## 2020-07-08 DIAGNOSIS — F41.1 GAD (GENERALIZED ANXIETY DISORDER): Primary | ICD-10-CM

## 2020-07-08 DIAGNOSIS — F25.1: ICD-10-CM

## 2020-07-08 DIAGNOSIS — F33.1 MDD (MAJOR DEPRESSIVE DISORDER), RECURRENT EPISODE, MODERATE: ICD-10-CM

## 2020-07-08 PROCEDURE — 99213 OFFICE O/P EST LOW 20 MIN: CPT | Mod: 95,,, | Performed by: NURSE PRACTITIONER

## 2020-07-08 PROCEDURE — 99213 PR OFFICE/OUTPT VISIT, EST, LEVL III, 20-29 MIN: ICD-10-PCS | Mod: 95,,, | Performed by: NURSE PRACTITIONER

## 2020-07-08 RX ORDER — BENZTROPINE MESYLATE 1 MG/1
1 TABLET ORAL 2 TIMES DAILY
Qty: 60 TABLET | Refills: 2 | Status: SHIPPED | OUTPATIENT
Start: 2020-07-08 | End: 2020-08-11

## 2020-07-08 RX ORDER — CARIPRAZINE 3 MG/1
3 CAPSULE, GELATIN COATED ORAL DAILY
Qty: 30 CAPSULE | Refills: 1 | Status: SHIPPED | OUTPATIENT
Start: 2020-07-08 | End: 2020-08-11 | Stop reason: SDUPTHER

## 2020-07-08 RX ORDER — BUPROPION HYDROCHLORIDE 100 MG/1
100 TABLET, EXTENDED RELEASE ORAL DAILY
Qty: 30 TABLET | Refills: 1 | Status: SHIPPED | OUTPATIENT
Start: 2020-07-08 | End: 2020-08-07

## 2020-07-08 RX ORDER — MIRTAZAPINE 30 MG/1
30 TABLET, FILM COATED ORAL NIGHTLY
Qty: 30 TABLET | Refills: 2 | Status: SHIPPED | OUTPATIENT
Start: 2020-07-08 | End: 2020-08-10

## 2020-07-08 NOTE — PROGRESS NOTES
"Outpatient Psychiatry Follow-Up Visit (MD/NP)    7/8/2020     The patient location is: Home in [place], [place]  The chief complaint leading to consultation is: Psychosis and anxiety   Visit type: audiovisual    Face to Face time with patient: 20 minutes  30 minutes of total time spent on the encounter, which includes face to face time and non-face to face time preparing to see the patient (eg, review of tests), Obtaining and/or reviewing separately obtained history, Documenting clinical information in the electronic or other health record, Independently interpreting results (not separately reported) and communicating results to the patient/family/caregiver, or Care coordination (not separately reported).       Each patient to whom he or she provides medical services by telemedicine is:  (1) informed of the relationship between the physician and patient and the respective role of any other health care provider with respect to management of the patient; and (2) notified that he or she may decline to receive medical services by telemedicine and may withdraw from such care at any time.    Clinical Status of Patient:  Outpatient (Ambulatory)    Chief Complaint:  Lesia Lawson is a 24 y.o. male who presents today for follow-up of depression, anxiety and psychosis  Met with patient.      Interval History and Content of Current Session:  Interim Events/Subjective Report/Content of Current Session:   He reported doing well since his last visit. Patient described his mood as "overall I feel better. I have not felt depressed since I started taking wellbutrin 150 mg daily. But taking wellbutrin 150 mg daily increased my anxiety and I had a mild blurred vision while I was looking at my phone or my computer but not if I was looking into anything else. Since I cut wellbutrin in half I am feeling great. I am not anxious or depressed and there is no blurred vision". Patient is still taking Remeron 30 mg po qhs. He reported good " "sleep and appetite. Patient stated that he is working a temporary job at this time. When questioned about auditory and visual hallucinations he stated, "I do not have them now". He denied SI/HI. He is taking vraylar 3 mg po daily and Remeron 30 mg qhs. He denied any side effects to his current medications except when taking wellbutrin  mg daily but not when he cut it in half and takes wellbutrin 75 mg po daily. We discussed how wellbutrin XL is extended release and is should not be crushed or chewed. We discussed prescribing wellbutrin   mg po daily and he agreed. He denied any new medical/physical/Medication at this time. He is stable.     Medication Trails  Abilify (was not effective enough)  Zyprexa (was too strong and it made me feel high, did not get rid of hallucination and increased the thoughts)  Risperdal (it made me feel high. It me feel like I am outside myself. It made me slow and it increased my thoughts."  Vistaril 25-50mg po qhs (was helpful but does not need it while taking Remeron)      Psychotherapy:  · Target symptoms: depression, anxiety , psychosis  · Why chosen therapy is appropriate versus another modality: relevant to diagnosis, patient responds to this modality, evidence based practice  · Outcome monitoring methods: self-report, observation, feedback from family  · Therapeutic intervention type: insight oriented psychotherapy, behavior modifying psychotherapy, supportive psychotherapy  · Topics discussed/themes: building skills sets for symptom management, symptom recognition  · The patient's response to the intervention is motivated. The patient's progress toward treatment goals is fair.   · Duration of intervention: 7 minutes.    Review of Systems   · PSYCHIATRIC: Pertinant items are noted in the narrative.  · CONSTITUTIONAL: No weight gain or loss.   · MUSCULOSKELETAL: No pain or stiffness of the joints.  · NEUROLOGIC: No weakness, sensory changes, seizures, confusion, memory " "loss, tremor or other abnormal movements.  · ENDOCRINE: No polydipsia or polyuria.  · INTEGUMENTARY: No rashes or lacerations.  · EYES: No exophthalmos, jaundice or blindness.  · ENT: No dizziness, tinnitus or hearing loss.  · RESPIRATORY: No shortness of breath.  · CARDIOVASCULAR: No tachycardia or chest pain.  · GASTROINTESTINAL: No nausea, vomiting, pain, constipation or diarrhea.  · GENITOURINARY: No frequency, dysuria or sexual dysfunction.  · HEMATOLOGIC/LYMPHATIC: No excessive bleeding, prolonged or excessive bleeding after dental extraction/injury.  · ALLERGIC/IMMUNOLOGIC: No allergic response to materials, foods or animals at this time.    Past Medical, Family and Social History: The patient's past medical, family and social history have been reviewed and updated as appropriate within the electronic medical record - see encounter notes.    Compliance: yes    Side effects: None    Risk Parameters:  Patient reports no suicidal ideation  Patient reports no homicidal ideation  Patient reports no self-injurious behavior  Patient reports no violent behavior    Exam (detailed: at least 9 elements; comprehensive: all 15 elements)   Constitutional  Vitals:  Most recent vital signs, dated greater than 90 days prior to this appointment, were reviewed.   There were no vitals filed for this visit.     General:  unremarkable, age appropriate     Musculoskeletal  Muscle Strength/Tone:  no dystonia, no tremor, no tic   Gait & Station:  non-ataxic     Psychiatric  Speech:  no latency; no press   Mood & Affect:  "better"  congruent and appropriate   Thought Process:  normal and logical   Associations:  intact   Thought Content:  normal, no suicidality, no homicidality, delusions, or paranoia   Insight:  intact, has awareness of illness   Judgement: behavior is adequate to circumstances   Orientation:  grossly intact   Memory: intact for content of interview   Language: grossly intact   Attention Span & Concentration:  able " to focus   Fund of Knowledge:  intact and appropriate to age and level of education     Assessment and Diagnosis   Status/Progress: Based on the examination today, the patient's problem(s) is/are improved.  New problems have not been presented today.   Co-morbidities, Diagnostic uncertainty and Lack of compliance are not complicating management of the primary condition.  There are no active rule-out diagnoses for this patient at this time.     General Impression: Doing better and stable.        ICD-10-CM ICD-9-CM   1. ANGELIC (generalized anxiety disorder)  F41.1 300.02   2. Schizophreniform psychosis, depressive type  F25.1 295.70   3. MDD (major depressive disorder), recurrent episode, moderate  F33.1 296.32       Intervention/Counseling/Treatment Plan     · Medication Management:   ? Discontinue Zyprexa to 5 mg po qam and 15 mg po qhs (not effective)  ? Continue vraylar 3  for psychosis  ? Discontinue vistaril 25 mg po prn and 50 mg po qhs (does not need it)  ? Continue Remeron 30 mg po qhs for depression, anxiety and sleep  ? Decrease wellbutrin to   mg po daily for depressed mood  · Labs: reviewed most recent labs. New labs due 3/2021  · AIMS due in 3/2021  · The treatment plan and follow up plan were reviewed with the patient.  · Discussed with patient informed consent, risks vs. benefits, alternative treatments, side effect profile and the inherent unpredictability of individual responses to these treatments. The patient expresses understanding of the above and displays the capacity to agree with this current plan and had no other questions.  · Encouraged Patient to keep future appointments.   · Take medications as prescribed and abstain from substance abuse.   · In the event of an emergency patient was advised to go to the emergency room.  · Referral for further treatment to social work team for psychotherapy    Return to Clinic: 1 month or earlier as needed    DOREEN Vance-BC

## 2020-07-08 NOTE — PATIENT INSTRUCTIONS
Mirtazapine tablets  What is this medicine?  MIRTAZAPINE (melisa FAB a peen) is used to treat depression.  How should I use this medicine?  Take this medicine by mouth with a glass of water. Follow the directions on the prescription label. Take your medicine at regular intervals. Do not take your medicine more often than directed. Do not stop taking this medicine suddenly except upon the advice of your doctor. Stopping this medicine too quickly may cause serious side effects or your condition may worsen.  A special MedGuide will be given to you by the pharmacist with each prescription and refill. Be sure to read this information carefully each time.  Talk to your pediatrician regarding the use of this medicine in children. Special care may be needed.  What side effects may I notice from receiving this medicine?  Side effects that you should report to your doctor or health care professional as soon as possible:  · allergic reactions like skin rash, itching or hives, swelling of the face, lips, or tongue  · breathing problems  · confusion  · fever, sore throat, or mouth ulcers or blisters  · flu like symptoms including fever, chills, cough, muscle or joint aches and pains  · stomach pain with nausea and/or vomiting  · suicidal thoughts or other mood changes  · swelling of the hands or feet  · unusual bleeding or bruising  · unusually weak or tired  · vomiting  Side effects that usually do not require medical attention (report to your doctor or health care professional if they continue or are bothersome):  · constipation  · increased appetite  · weight gain  What may interact with this medicine?  Do not take this medicine with any of the following medications:  · linezolid  · MAOIs like Carbex, Eldepryl, Marplan, Nardil, and Parnate  · methylene blue (injected into a vein)  This medicine may also interact with the following medications:  · alcohol  · antiviral medicines for HIV or AIDS  · certain medicines that treat or  prevent blood clots like warfarin  · certain medicines for depression, anxiety, or psychotic disturbances  · certain medicines for fungal infections like ketoconazole and itraconazole  · certain medicines for migraine headache like almotriptan, eletriptan, frovatriptan, naratriptan, rizatriptan, sumatriptan, zolmitriptan  · certain medicines for seizures like carbamazepine or phenytoin  · certain medicines for sleep  · cimetidine  · erythromycin  · fentanyl  · lithium  · medicines for blood pressure  · nefazodone  · rasagiline  · rifampin  · supplements like [place]'s wort, kava kava, valerian  · tramadol  · tryptophan  What if I miss a dose?  If you miss a dose, take it as soon as you can. If it is almost time for your next dose, take only that dose. Do not take double or extra doses.  Where should I keep my medicine?  Keep out of the reach of children.  Store at room temperature between 15 and 30 degrees C (59 and 86 degrees F) Protect from light and moisture. Throw away any unused medicine after the expiration date.  What should I tell my health care provider before I take this medicine?  They need to know if you have any of these conditions:  · bipolar disorder  · glaucoma  · kidney disease  · liver disease  · suicidal thoughts  · an unusual or allergic reaction to mirtazapine, other medicines, foods, dyes, or preservatives  · pregnant or trying to get pregnant  · breast-feeding  What should I watch for while using this medicine?  Tell your doctor if your symptoms do not get better or if they get worse. Visit your doctor or health care professional for regular checks on your progress. Because it may take several weeks to see the full effects of this medicine, it is important to continue your treatment as prescribed by your doctor.  Patients and their families should watch out for new or worsening thoughts of suicide or depression. Also watch out for sudden changes in feelings such as feeling anxious, agitated,  panicky, irritable, hostile, aggressive, impulsive, severely restless, overly excited and hyperactive, or not being able to sleep. If this happens, especially at the beginning of treatment or after a change in dose, call your health care professional.  You may get drowsy or dizzy. Do not drive, use machinery, or do anything that needs mental alertness until you know how this medicine affects you. Do not stand or sit up quickly, especially if you are an older patient. This reduces the risk of dizzy or fainting spells. Alcohol may interfere with the effect of this medicine. Avoid alcoholic drinks.  This medicine may cause dry eyes and blurred vision. If you wear contact lenses you may feel some discomfort. Lubricating drops may help. See your eye doctor if the problem does not go away or is severe.  Your mouth may get dry. Chewing sugarless gum or sucking hard candy, and drinking plenty of water may help. Contact your doctor if the problem does not go away or is severe.  NOTE:This sheet is a summary. It may not cover all possible information. If you have questions about this medicine, talk to your doctor, pharmacist, or health care provider. Copyright© 2017 [employer]        Cariprazine oral capsules  What is this medicine?  CARIPRAZINE (car i PRA zeen) is an antipsychotic. It is used to treat schizophrenia or bipolar disorder. Bipolar disorder is also known as manic-depression.  How should I use this medicine?  Take this medicine by mouth with a glass of water. Follow the directions on the prescription label. You may take it with or without food. Take your medicine at regular intervals. Do not take it more often than directed. Do not stop taking except on your doctor's advice.  Talk to your pediatrician regarding the use of this medicine in children. Special care may be needed.  What side effects may I notice from receiving this medicine?  Side effects that you should report to your doctor or health care  professional as soon as possible:  · allergic reactions like skin rash, itching or hives, swelling of the face, lips, or tongue  · changes in emotions or moods  · confusion  · difficulty swallowing  · feeling faint or lightheaded, falls  · fever or chills, sore throat  · inability to control muscle movements in the face, mouth, hands, arms, or legs  · increased hunger or thirst  · increased urination  · missed or irregular menstrual periods  · problems with balance, talking, walking  · seizures  · stiff muscles  · suicidal thoughts or other mood changes  · unusually weak or tired  Side effects that usually do not require medical attention (Report these to your doctor or health care professional if they continue or are bothersome.):  · constipation  · dizziness  · drowsiness  · nausea, vomiting  · restlessness  · upset stomach  · weight gain  What may interact with this medicine?  Do not take this medicine with any of the following medications:  · metoclopramide  This medicine may also interact with the following medications:  · carbamazepine  · certain medicines for depression, anxiety, or psychotic disturbances  · certain medicines for sleep  · itraconazole  · ketoconazole  · medicines for blood pressure  · rifampin  · Sg's wort  What if I miss a dose?  If you miss a dose, take it as soon as you can. If it is almost time for your next dose, take only that dose. Do not take double or extra doses.  Where should I keep my medicine?  Keep out of the reach of children.  Store at room temperature between 15 and 30 degrees C (59 and 86 degrees F). Protect from light. Throw away any unused medicine after the expiration date.  What should I tell my health care provider before I take this medicine?  They need to know if you have any of these conditions:  · dementia  · diabetes  · difficulty swallowing  · heart disease  · history of breast cancer  · kidney disease  · liver disease  · low blood counts, like low white  cell, platelet, or red cell counts  · low blood pressure  · Parkinson's disease  · seizures  · suicidal thoughts, plans, or attempt; a previous suicide attempt by you or a family member  · an unusual or allergic reaction to cariprazine, other medicines, foods, dyes, or preservatives  · pregnant or trying to get pregnant  · breast-feeding  What should I watch for while using this medicine?  Visit your doctor or health care professional for regular checks on your progress. It may be several weeks before you see the full effects of this medicine. Notify your doctor or health care professional if your symptoms get worse, if you have new symptoms, if you are having an unusual effect from this medicine, or if you feel out of control, very discouraged or think you might harm yourself or others.  Do not suddenly stop taking this medicine. You may need to gradually reduce the dose. Ask your doctor or health care professional for advice.  You may get dizzy or drowsy. Do not drive, use machinery, or do anything that needs mental alertness until you know how this medicine affects you. Do not stand or sit up quickly, especially if you are an older patient. This reduces the risk of dizzy or fainting spells. Alcohol can increase dizziness and drowsiness. Avoid alcoholic drinks.  This medicine may cause dry eyes and blurred vision. If you wear contact lenses you may feel some discomfort. Lubricating drops may help. See your eye doctor if the problem does not go away or is severe.  This medicine can reduce the response of your body to heat or cold. Dress warm in cold weather and stay hydrated in hot weather. If possible, avoid extreme temperatures like saunas, hot tubs, very hot or cold showers, or activities that can cause dehydration such as vigorous exercise.  Women should inform their doctor if they wish to become pregnant or think they might be pregnant. The effects of this medicine on an unborn child are not known. A registry  is available to monitor pregnancy outcomes in pregnant women exposed to this medicine or similar medicines. Talk to your health care professional or pharmacist for more information.  NOTE:This sheet is a summary. It may not cover all possible information. If you have questions about this medicine, talk to your doctor, pharmacist, or health care provider. Copyright© 2017 [employer]          Bupropion sustained-release tablets (Depression/Mood Disorders)  What is this medicine?  BUPROPION (byoo PROE pee on) is used to treat depression.  How should I use this medicine?  Take this medicine by mouth with a glass of water. Follow the directions on the prescription label. You can take it with or without food. If it upsets your stomach, take it with food. Do not cut, crush or chew this medicine. Take your medicine at regular intervals. If you take this medicine more than once a day, take your second dose at least 8 hours after you take your first dose. To limit difficulty in sleeping, avoid taking this medicine at bedtime. Do not take your medicine more often than directed. Do not stop taking this medicine suddenly except upon the advice of your doctor. Stopping this medicine too quickly may cause serious side effects or your condition may worsen.  A special MedGuide will be given to you by the pharmacist with each prescription and refill. Be sure to read this information carefully each time.  Talk to your pediatrician regarding the use of this medicine in children. Special care may be needed.  What side effects may I notice from receiving this medicine?  Side effects that you should report to your doctor or health care professional as soon as possible:  · allergic reactions like skin rash, itching or hives, swelling of the face, lips, or tongue  · breathing problems  · changes in vision  · confusion  · fast or irregular heartbeat  · hallucinations  · increased blood pressure  · redness, blistering, peeling or loosening  of the skin, including inside the mouth  · seizures  · suicidal thoughts or other mood changes  · unusually weak or tired  · vomiting  Side effects that usually do not require medical attention (report to your doctor or health care professional if they continue or are bothersome):  · change in sex drive or performance  · constipation  · headache  · loss of appetite  · nausea  · tremors  · weight loss  What may interact with this medicine?  Do not take this medicine with any of the following medications:  · linezolid  · MAOIs like Azilect, Carbex, Eldepryl, Marplan, Nardil, and Parnate  · methylene blue (injected into a vein)  · other medicines that contain bupropion like Zyban  This medicine may also interact with the following medications:  · alcohol  · certain medicines for anxiety or sleep  · certain medicines for blood pressure like metoprolol, propranolol  · certain medicines for depression or psychotic disturbances  · certain medicines for HIV or AIDS like efavirenz, lopinavir, nelfinavir, ritonavir  · certain medicines for irregular heart beat like propafenone, flecainide  · certain medicines for Parkinson's disease like amantadine, levodopa  · certain medicines for seizures like carbamazepine, phenytoin, phenobarbital  · cimetidine  · clopidogrel  · cyclophosphamide  · furazolidone  · isoniazid  · nicotine  · orphenadrine  · procarbazine  · steroid medicines like prednisone or cortisone  · stimulant medicines for attention disorders, weight loss, or to stay awake  · tamoxifen  · theophylline  · thiotepa  · ticlopidine  · tramadol  · warfarin  What if I miss a dose?  If you miss a dose, skip the missed dose and take your next tablet at the regular time. There should be at least 8 hours between doses. Do not take double or extra doses.  Where should I keep my medicine?  Keep out of the reach of children.  Store at room temperature between 20 and 25 degrees C (68 and 77 degrees F), away from direct sunlight and  moisture. Keep tightly closed. Throw away any unused medicine after the expiration date.  What should I tell my health care provider before I take this medicine?  They need to know if you have any of these conditions:  · an eating disorder, such as anorexia or bulimia  · bipolar disorder or psychosis  · diabetes or high blood sugar, treated with medication  · glaucoma  · head injury or brain tumor  · heart disease, previous heart attack, or irregular heart beat  · high blood pressure  · kidney or liver disease  · seizures  · suicidal thoughts or a previous suicide attempt  · Tourette's syndrome  · weight loss  · an unusual or allergic reaction to bupropion, other medicines, foods, dyes, or preservatives  · breast-feeding  · pregnant or trying to become pregnant  What should I watch for while using this medicine?  Tell your doctor if your symptoms do not get better or if they get worse. Visit your doctor or health care professional for regular checks on your progress. Because it may take several weeks to see the full effects of this medicine, it is important to continue your treatment as prescribed by your doctor.  Patients and their families should watch out for new or worsening thoughts of suicide or depression. Also watch out for sudden changes in feelings such as feeling anxious, agitated, panicky, irritable, hostile, aggressive, impulsive, severely restless, overly excited and hyperactive, or not being able to sleep. If this happens, especially at the beginning of treatment or after a change in dose, call your health care professional.  Avoid alcoholic drinks while taking this medicine. Drinking excessive alcoholic beverages, using sleeping or anxiety medicines, or quickly stopping the use of these agents while taking this medicine may increase your risk for a seizure.  Do not drive or use heavy machinery until you know how this medicine affects you. This medicine can impair your ability to perform these  tasks.  Do not take this medicine close to bedtime. It may prevent you from sleeping.  Your mouth may get dry. Chewing sugarless gum or sucking hard candy, and drinking plenty of water may help. Contact your doctor if the problem does not go away or is severe.  NOTE:This sheet is a summary. It may not cover all possible information. If you have questions about this medicine, talk to your doctor, pharmacist, or health care provider. Copyright© 2017 [employer]

## 2020-07-20 PROBLEM — Z13.9 ENCOUNTER FOR MEDICAL SCREENING EXAMINATION: Status: RESOLVED | Noted: 2020-03-29 | Resolved: 2020-07-20

## 2020-07-22 ENCOUNTER — OFFICE VISIT (OUTPATIENT)
Dept: PSYCHIATRY | Facility: CLINIC | Age: 25
End: 2020-07-22
Payer: COMMERCIAL

## 2020-07-22 DIAGNOSIS — F25.1: Primary | ICD-10-CM

## 2020-07-22 DIAGNOSIS — F41.1 GAD (GENERALIZED ANXIETY DISORDER): ICD-10-CM

## 2020-07-22 DIAGNOSIS — F33.1 MDD (MAJOR DEPRESSIVE DISORDER), RECURRENT EPISODE, MODERATE: ICD-10-CM

## 2020-07-22 DIAGNOSIS — F20.3 UNDIFFERENTIATED SCHIZOPHRENIA: ICD-10-CM

## 2020-07-22 PROCEDURE — 99214 OFFICE O/P EST MOD 30 MIN: CPT | Mod: 95,,, | Performed by: NURSE PRACTITIONER

## 2020-07-22 PROCEDURE — 99214 PR OFFICE/OUTPT VISIT, EST, LEVL IV, 30-39 MIN: ICD-10-PCS | Mod: 95,,, | Performed by: NURSE PRACTITIONER

## 2020-07-22 RX ORDER — CARIPRAZINE 4.5 MG/1
4.5 CAPSULE, GELATIN COATED ORAL DAILY
Qty: 30 CAPSULE | Refills: 1 | Status: SHIPPED | OUTPATIENT
Start: 2020-07-22 | End: 2020-07-22 | Stop reason: SDUPTHER

## 2020-07-22 RX ORDER — CARIPRAZINE 4.5 MG/1
4.5 CAPSULE, GELATIN COATED ORAL DAILY
Qty: 30 CAPSULE | Refills: 1 | Status: SHIPPED | OUTPATIENT
Start: 2020-07-22 | End: 2020-08-21

## 2020-07-22 NOTE — PROGRESS NOTES
"Outpatient Psychiatry Follow-Up Visit (MD/NP)    7/22/2020     The patient location is: Home in [place], [place]  The chief complaint leading to consultation is: Psychosis and anxiety   Visit type: audiovisual    Face to Face time with patient: 15 minutes  30 minutes of total time spent on the encounter, which includes face to face time and non-face to face time preparing to see the patient (eg, review of tests), Obtaining and/or reviewing separately obtained history, Documenting clinical information in the electronic or other health record, Independently interpreting results (not separately reported) and communicating results to the patient/family/caregiver, or Care coordination (not separately reported).       Each patient to whom he or she provides medical services by telemedicine is:  (1) informed of the relationship between the physician and patient and the respective role of any other health care provider with respect to management of the patient; and (2) notified that he or she may decline to receive medical services by telemedicine and may withdraw from such care at any time.    Clinical Status of Patient:  Outpatient (Ambulatory)    Chief Complaint:  Lesia Lawson is a 24 y.o. male who presents today for follow-up of depression, anxiety and psychosis  Met with patient.      Interval History and Content of Current Session:  Interim Events/Subjective Report/Content of Current Session:   He reported doing well since his last visit. Patient described his mood as "good but I am having some complications. The voices increase sometimes and sometimes they decrease. The voices seem to intensify. I can go 3 days without experiencing it and then I start hearing them again. The voices tell me bad words but nothing like harming myself or others". We discussed increasing vraylar to 4.5 mg po daily due to increased auditory hallucinations and he is agreeable. He reported improved depression with taking half tab of " "Wellbutrin  mg po daily. Patient is aware that he should not cut the Wellbutrin  XL in half because it is extended release and stated, "I know but when I break Wellbutrin  mg po daily in half, it works for me and I don't feel depressed. When I took Wellbutrin  I started feeling depressed again".  He stated that his anxiety is improving with his current medication regimen. He denied SI/HI/VH and no delusions or paranoia. He is still working very part time and finds it helping his depression. Patient denied having blurred vision or any other side effects. Patient is still taking Remeron 30 mg po qhs. He reported good sleep and appetite. He denied any new medical/physical/Medication at this time. He is stable.     Medication Trails  Abilify (was not effective enough)  Zyprexa (was too strong and it made me feel high, did not get rid of hallucination and increased the thoughts)  Risperdal (it made me feel high. It me feel like I am outside myself. It made me slow and it increased my thoughts."  Vistaril 25-50mg po qhs (was helpful but does not need it while taking Remeron)  Wellbutrin  mg po daily      Psychotherapy:  · Target symptoms: depression, anxiety , psychosis  · Why chosen therapy is appropriate versus another modality: relevant to diagnosis, patient responds to this modality, evidence based practice  · Outcome monitoring methods: self-report, observation, feedback from family  · Therapeutic intervention type: insight oriented psychotherapy, behavior modifying psychotherapy, supportive psychotherapy  · Topics discussed/themes: building skills sets for symptom management, symptom recognition  · The patient's response to the intervention is motivated. The patient's progress toward treatment goals is fair.   · Duration of intervention: 5 minutes.    Review of Systems   · PSYCHIATRIC: Pertinant items are noted in the narrative.  · CONSTITUTIONAL: No weight gain or loss.   · MUSCULOSKELETAL: " "No pain or stiffness of the joints.  · NEUROLOGIC: No weakness, sensory changes, seizures, confusion, memory loss, tremor or other abnormal movements.  · ENDOCRINE: No polydipsia or polyuria.  · INTEGUMENTARY: No rashes or lacerations.  · EYES: No exophthalmos, jaundice or blindness.  · ENT: No dizziness, tinnitus or hearing loss.  · RESPIRATORY: No shortness of breath.  · CARDIOVASCULAR: No tachycardia or chest pain.  · GASTROINTESTINAL: No nausea, vomiting, pain, constipation or diarrhea.  · GENITOURINARY: No frequency, dysuria or sexual dysfunction.  · HEMATOLOGIC/LYMPHATIC: No excessive bleeding, prolonged or excessive bleeding after dental extraction/injury.  · ALLERGIC/IMMUNOLOGIC: No allergic response to materials, foods or animals at this time.    Past Medical, Family and Social History: The patient's past medical, family and social history have been reviewed and updated as appropriate within the electronic medical record - see encounter notes.    Compliance: yes    Side effects: None    Risk Parameters:  Patient reports no suicidal ideation  Patient reports no homicidal ideation  Patient reports no self-injurious behavior  Patient reports no violent behavior    Exam (detailed: at least 9 elements; comprehensive: all 15 elements)   Constitutional  Vitals:  Most recent vital signs, dated greater than 90 days prior to this appointment, were reviewed.   There were no vitals filed for this visit.     General:  unremarkable, age appropriate     Musculoskeletal  Muscle Strength/Tone:  no dystonia, no tremor, no tic   Gait & Station:  non-ataxic     Psychiatric  Speech:  no latency; no press   Mood & Affect:  "Good"  congruent and appropriate   Thought Process:  normal and logical   Associations:  intact   Thought Content:  normal, no suicidality, no homicidality, delusions, or paranoia   Insight:  intact, has awareness of illness   Judgement: behavior is adequate to circumstances   Orientation:  grossly intact "   Memory: intact for content of interview   Language: grossly intact   Attention Span & Concentration:  able to focus   Fund of Knowledge:  intact and appropriate to age and level of education     Assessment and Diagnosis   Status/Progress: Based on the examination today, the patient's problem(s) is/are improved.  New problems have not been presented today.   Co-morbidities, Diagnostic uncertainty and Lack of compliance are not complicating management of the primary condition.  There are no active rule-out diagnoses for this patient at this time.     General Impression: Stable despite returning of auditory hallucinations. We will continue to monitor closely        ICD-10-CM ICD-9-CM   1. Schizophreniform psychosis, depressive type  F25.1 295.70     F20.3 295.90   2. ANGELIC (generalized anxiety disorder)  F41.1 300.02   3. MDD (major depressive disorder), recurrent episode, moderate  F33.1 296.32     v  Intervention/Counseling/Treatment Plan     · Medication Management:   ? Discontinue Zyprexa to 5 mg po qam and 15 mg po qhs (not effective)  ? Increase vraylar to 4.5 mg po daily for psychosis and increased auditory hallucinations  ? Discontinue vistaril 25 mg po prn and 50 mg po qhs (does not need it)  ? Continue Remeron 30 mg po qhs for depression, anxiety and sleep  ? Continue Wellbutrin  mg po daily. He is not taking Wellbutrin  mg po daily for depressed mood (per patient, he felt depressed on Wellbutrin  mg daily and felt better with taking half tab of Wellbutrin  mg po daily.  · Labs: reviewed most recent labs. New labs due 3/2021  · AIMS due in 3/2021  · The treatment plan and follow up plan were reviewed with the patient.  · Discussed with patient informed consent, risks vs. benefits, alternative treatments, side effect profile and the inherent unpredictability of individual responses to these treatments. The patient expresses understanding of the above and displays the capacity to agree  with this current plan and had no other questions.  · Encouraged Patient to keep future appointments.   · Take medications as prescribed and abstain from substance abuse.   · In the event of an emergency patient was advised to go to the emergency room.  · Referral for further treatment to social work team for psychotherapy    Return to Clinic: 2 weeks-4 weeks or earlier as needed    DOREEN Vance-BC

## 2020-07-22 NOTE — PATIENT INSTRUCTIONS
Mirtazapine tablets  What is this medicine?  MIRTAZAPINE (melisa FAB a peen) is used to treat depression.  How should I use this medicine?  Take this medicine by mouth with a glass of water. Follow the directions on the prescription label. Take your medicine at regular intervals. Do not take your medicine more often than directed. Do not stop taking this medicine suddenly except upon the advice of your doctor. Stopping this medicine too quickly may cause serious side effects or your condition may worsen.  A special MedGuide will be given to you by the pharmacist with each prescription and refill. Be sure to read this information carefully each time.  Talk to your pediatrician regarding the use of this medicine in children. Special care may be needed.  What side effects may I notice from receiving this medicine?  Side effects that you should report to your doctor or health care professional as soon as possible:  · allergic reactions like skin rash, itching or hives, swelling of the face, lips, or tongue  · breathing problems  · confusion  · fever, sore throat, or mouth ulcers or blisters  · flu like symptoms including fever, chills, cough, muscle or joint aches and pains  · stomach pain with nausea and/or vomiting  · suicidal thoughts or other mood changes  · swelling of the hands or feet  · unusual bleeding or bruising  · unusually weak or tired  · vomiting  Side effects that usually do not require medical attention (report to your doctor or health care professional if they continue or are bothersome):  · constipation  · increased appetite  · weight gain  What may interact with this medicine?  Do not take this medicine with any of the following medications:  · linezolid  · MAOIs like Carbex, Eldepryl, Marplan, Nardil, and Parnate  · methylene blue (injected into a vein)  This medicine may also interact with the following medications:  · alcohol  · antiviral medicines for HIV or AIDS  · certain medicines that treat or  prevent blood clots like warfarin  · certain medicines for depression, anxiety, or psychotic disturbances  · certain medicines for fungal infections like ketoconazole and itraconazole  · certain medicines for migraine headache like almotriptan, eletriptan, frovatriptan, naratriptan, rizatriptan, sumatriptan, zolmitriptan  · certain medicines for seizures like carbamazepine or phenytoin  · certain medicines for sleep  · cimetidine  · erythromycin  · fentanyl  · lithium  · medicines for blood pressure  · nefazodone  · rasagiline  · rifampin  · supplements like [place] wort, kava kava, valerian  · tramadol  · tryptophan  What if I miss a dose?  If you miss a dose, take it as soon as you can. If it is almost time for your next dose, take only that dose. Do not take double or extra doses.  Where should I keep my medicine?  Keep out of the reach of children.  Store at room temperature between 15 and 30 degrees C (59 and 86 degrees F) Protect from light and moisture. Throw away any unused medicine after the expiration date.  What should I tell my health care provider before I take this medicine?  They need to know if you have any of these conditions:  · bipolar disorder  · glaucoma  · kidney disease  · liver disease  · suicidal thoughts  · an unusual or allergic reaction to mirtazapine, other medicines, foods, dyes, or preservatives  · pregnant or trying to get pregnant  · breast-feeding  What should I watch for while using this medicine?  Tell your doctor if your symptoms do not get better or if they get worse. Visit your doctor or health care professional for regular checks on your progress. Because it may take several weeks to see the full effects of this medicine, it is important to continue your treatment as prescribed by your doctor.  Patients and their families should watch out for new or worsening thoughts of suicide or depression. Also watch out for sudden changes in feelings such as feeling anxious, agitated,  panicky, irritable, hostile, aggressive, impulsive, severely restless, overly excited and hyperactive, or not being able to sleep. If this happens, especially at the beginning of treatment or after a change in dose, call your health care professional.  You may get drowsy or dizzy. Do not drive, use machinery, or do anything that needs mental alertness until you know how this medicine affects you. Do not stand or sit up quickly, especially if you are an older patient. This reduces the risk of dizzy or fainting spells. Alcohol may interfere with the effect of this medicine. Avoid alcoholic drinks.  This medicine may cause dry eyes and blurred vision. If you wear contact lenses you may feel some discomfort. Lubricating drops may help. See your eye doctor if the problem does not go away or is severe.  Your mouth may get dry. Chewing sugarless gum or sucking hard candy, and drinking plenty of water may help. Contact your doctor if the problem does not go away or is severe.  NOTE:This sheet is a summary. It may not cover all possible information. If you have questions about this medicine, talk to your doctor, pharmacist, or health care provider. Copyright© 2017 [employer]        Bupropion extended-release tablets (Depression/Mood Disorders)  What is this medicine?  BUPROPION (byoo PROE pee on) is used to treat depression.  How should I use this medicine?  Take this medicine by mouth with a glass of water. Follow the directions on the prescription label. You can take it with or without food. If it upsets your stomach, take it with food. Do not crush, chew, or cut these tablets. This medicine is taken once daily at the same time each day. Do not take your medicine more often than directed. Do not stop taking this medicine suddenly except upon the advice of your doctor. Stopping this medicine too quickly may cause serious side effects or your condition may worsen.  A special MedGuide will be given to you by the pharmacist  with each prescription and refill. Be sure to read this information carefully each time.  Talk to your pediatrician regarding the use of this medicine in children. Special care may be needed.  What side effects may I notice from receiving this medicine?  Side effects that you should report to your doctor or health care professional as soon as possible:  · allergic reactions like skin rash, itching or hives, swelling of the face, lips, or tongue  · breathing problems  · changes in vision  · confusion  · fast or irregular heartbeat  · hallucinations  · increased blood pressure  · redness, blistering, peeling or loosening of the skin, including inside the mouth  · seizures  · suicidal thoughts or other mood changes  · unusually weak or tired  · vomiting  Side effects that usually do not require medical attention (report to your doctor or health care professional if they continue or are bothersome):  · change in sex drive or performance  · constipation  · headache  · loss of appetite  · nausea  · tremors  · weight loss  What may interact with this medicine?  Do not take this medicine with any of the following medications:  · linezolid  · MAOIs like Azilect, Carbex, Eldepryl, Marplan, Nardil, and Parnate  · methylene blue (injected into a vein)  · other medicines that contain bupropion like Zyban  This medicine may also interact with the following medications:  · alcohol  · certain medicines for anxiety or sleep  · certain medicines for blood pressure like metoprolol, propranolol  · certain medicines for depression or psychotic disturbances  · certain medicines for HIV or AIDS like efavirenz, lopinavir, nelfinavir, ritonavir  · certain medicines for irregular heart beat like propafenone, flecainide  · certain medicines for Parkinson's disease like amantadine, levodopa  · certain medicines for seizures like carbamazepine, phenytoin,  phenobarbital  · cimetidine  · clopidogrel  · cyclophosphamide  · furazolidone  · isoniazid  · nicotine  · orphenadrine  · procarbazine  · steroid medicines like prednisone or cortisone  · stimulant medicines for attention disorders, weight loss, or to stay awake  · tamoxifen  · theophylline  · thiotepa  · ticlopidine  · tramadol  · warfarin  What if I miss a dose?  If you miss a dose, skip the missed dose and take your next tablet at the regular time. Do not take double or extra doses.  Where should I keep my medicine?  Keep out of the reach of children.  Store at room temperature between 15 and 30 degrees C (59 and 86 degrees F). Throw away any unused medicine after the expiration date.  What should I tell my health care provider before I take this medicine?  They need to know if you have any of these conditions:  · an eating disorder, such as anorexia or bulimia  · bipolar disorder or psychosis  · diabetes or high blood sugar, treated with medication  · glaucoma  · head injury or brain tumor  · heart disease, previous heart attack, or irregular heart beat  · high blood pressure  · kidney or liver disease  · seizures (convulsions)  · suicidal thoughts or a previous suicide attempt  · Tourette's syndrome  · weight loss  · an unusual or allergic reaction to bupropion, other medicines, foods, dyes, or preservatives  · breast-feeding  · pregnant or trying to become pregnant  What should I watch for while using this medicine?  Tell your doctor if your symptoms do not get better or if they get worse. Visit your doctor or health care professional for regular checks on your progress. Because it may take several weeks to see the full effects of this medicine, it is important to continue your treatment as prescribed by your doctor.  Patients and their families should watch out for new or worsening thoughts of suicide or depression. Also watch out for sudden changes in feelings such as feeling anxious, agitated, panicky,  irritable, hostile, aggressive, impulsive, severely restless, overly excited and hyperactive, or not being able to sleep. If this happens, especially at the beginning of treatment or after a change in dose, call your health care professional.  Avoid alcoholic drinks while taking this medicine. Drinking large amounts of alcoholic beverages, using sleeping or anxiety medicines, or quickly stopping the use of these agents while taking this medicine may increase your risk for a seizure.  Do not drive or use heavy machinery until you know how this medicine affects you. This medicine can impair your ability to perform these tasks.  Do not take this medicine close to bedtime. It may prevent you from sleeping.  Your mouth may get dry. Chewing sugarless gum or sucking hard candy, and drinking plenty of water may help. Contact your doctor if the problem does not go away or is severe.  The tablet shell for some brands of this medicine does not dissolve. This is normal. The tablet shell may appear whole in the stool. This is not a cause for concern.  NOTE:This sheet is a summary. It may not cover all possible information. If you have questions about this medicine, talk to your doctor, pharmacist, or health care provider. Copyright© 2017 [employer]        Cariprazine oral capsules  What is this medicine?  CARIPRAZINE (car i PRA zeen) is an antipsychotic. It is used to treat schizophrenia or bipolar disorder. Bipolar disorder is also known as manic-depression.  How should I use this medicine?  Take this medicine by mouth with a glass of water. Follow the directions on the prescription label. You may take it with or without food. Take your medicine at regular intervals. Do not take it more often than directed. Do not stop taking except on your doctor's advice.  Talk to your pediatrician regarding the use of this medicine in children. Special care may be needed.  What side effects may I notice from receiving this medicine?  Side  effects that you should report to your doctor or health care professional as soon as possible:  · allergic reactions like skin rash, itching or hives, swelling of the face, lips, or tongue  · changes in emotions or moods  · confusion  · difficulty swallowing  · feeling faint or lightheaded, falls  · fever or chills, sore throat  · inability to control muscle movements in the face, mouth, hands, arms, or legs  · increased hunger or thirst  · increased urination  · missed or irregular menstrual periods  · problems with balance, talking, walking  · seizures  · stiff muscles  · suicidal thoughts or other mood changes  · unusually weak or tired  Side effects that usually do not require medical attention (Report these to your doctor or health care professional if they continue or are bothersome.):  · constipation  · dizziness  · drowsiness  · nausea, vomiting  · restlessness  · upset stomach  · weight gain  What may interact with this medicine?  Do not take this medicine with any of the following medications:  · metoclopramide  This medicine may also interact with the following medications:  · carbamazepine  · certain medicines for depression, anxiety, or psychotic disturbances  · certain medicines for sleep  · itraconazole  · ketoconazole  · medicines for blood pressure  · rifampin  · Sg's wort  What if I miss a dose?  If you miss a dose, take it as soon as you can. If it is almost time for your next dose, take only that dose. Do not take double or extra doses.  Where should I keep my medicine?  Keep out of the reach of children.  Store at room temperature between 15 and 30 degrees C (59 and 86 degrees F). Protect from light. Throw away any unused medicine after the expiration date.  What should I tell my health care provider before I take this medicine?  They need to know if you have any of these conditions:  · dementia  · diabetes  · difficulty swallowing  · heart disease  · history of breast cancer  · kidney  disease  · liver disease  · low blood counts, like low white cell, platelet, or red cell counts  · low blood pressure  · Parkinson's disease  · seizures  · suicidal thoughts, plans, or attempt; a previous suicide attempt by you or a family member  · an unusual or allergic reaction to cariprazine, other medicines, foods, dyes, or preservatives  · pregnant or trying to get pregnant  · breast-feeding  What should I watch for while using this medicine?  Visit your doctor or health care professional for regular checks on your progress. It may be several weeks before you see the full effects of this medicine. Notify your doctor or health care professional if your symptoms get worse, if you have new symptoms, if you are having an unusual effect from this medicine, or if you feel out of control, very discouraged or think you might harm yourself or others.  Do not suddenly stop taking this medicine. You may need to gradually reduce the dose. Ask your doctor or health care professional for advice.  You may get dizzy or drowsy. Do not drive, use machinery, or do anything that needs mental alertness until you know how this medicine affects you. Do not stand or sit up quickly, especially if you are an older patient. This reduces the risk of dizzy or fainting spells. Alcohol can increase dizziness and drowsiness. Avoid alcoholic drinks.  This medicine may cause dry eyes and blurred vision. If you wear contact lenses you may feel some discomfort. Lubricating drops may help. See your eye doctor if the problem does not go away or is severe.  This medicine can reduce the response of your body to heat or cold. Dress warm in cold weather and stay hydrated in hot weather. If possible, avoid extreme temperatures like saunas, hot tubs, very hot or cold showers, or activities that can cause dehydration such as vigorous exercise.  Women should inform their doctor if they wish to become pregnant or think they might be pregnant. The effects  of this medicine on an unborn child are not known. A registry is available to monitor pregnancy outcomes in pregnant women exposed to this medicine or similar medicines. Talk to your health care professional or pharmacist for more information.  NOTE:This sheet is a summary. It may not cover all possible information. If you have questions about this medicine, talk to your doctor, pharmacist, or health care provider. Copyright© 2017 [employer]

## 2020-08-11 RX ORDER — CARIPRAZINE 3 MG/1
3 CAPSULE, GELATIN COATED ORAL DAILY
Qty: 30 CAPSULE | Refills: 1 | Status: SHIPPED | OUTPATIENT
Start: 2020-08-11 | End: 2020-09-10

## 2020-11-02 ENCOUNTER — OFFICE VISIT (OUTPATIENT)
Dept: PSYCHIATRY | Facility: CLINIC | Age: 25
End: 2020-11-02
Payer: COMMERCIAL

## 2020-11-02 DIAGNOSIS — F25.1: ICD-10-CM

## 2020-11-02 DIAGNOSIS — F33.1 MDD (MAJOR DEPRESSIVE DISORDER), RECURRENT EPISODE, MODERATE: ICD-10-CM

## 2020-11-02 DIAGNOSIS — F41.1 GAD (GENERALIZED ANXIETY DISORDER): Primary | ICD-10-CM

## 2020-11-02 PROCEDURE — 99214 PR OFFICE/OUTPT VISIT, EST, LEVL IV, 30-39 MIN: ICD-10-PCS | Mod: 95,,, | Performed by: NURSE PRACTITIONER

## 2020-11-02 PROCEDURE — 90833 PR PSYCHOTHERAPY W/PATIENT W/E&M, 30 MIN (ADD ON): ICD-10-PCS | Mod: 95,,, | Performed by: NURSE PRACTITIONER

## 2020-11-02 PROCEDURE — 90833 PSYTX W PT W E/M 30 MIN: CPT | Mod: 95,,, | Performed by: NURSE PRACTITIONER

## 2020-11-02 PROCEDURE — 99214 OFFICE O/P EST MOD 30 MIN: CPT | Mod: 95,,, | Performed by: NURSE PRACTITIONER

## 2020-11-02 RX ORDER — CARIPRAZINE 3 MG/1
3 CAPSULE, GELATIN COATED ORAL DAILY
Qty: 30 CAPSULE | Refills: 2 | Status: SHIPPED | OUTPATIENT
Start: 2020-11-02 | End: 2020-12-02

## 2020-11-02 RX ORDER — MIRTAZAPINE 30 MG/1
30 TABLET, FILM COATED ORAL NIGHTLY
Qty: 90 TABLET | Refills: 0 | Status: SHIPPED | OUTPATIENT
Start: 2020-11-02 | End: 2020-12-23 | Stop reason: SDUPTHER

## 2020-11-02 RX ORDER — BUPROPION HYDROCHLORIDE 150 MG/1
150 TABLET ORAL DAILY
Qty: 30 TABLET | Refills: 2 | Status: SHIPPED | OUTPATIENT
Start: 2020-11-02 | End: 2020-12-23 | Stop reason: SDUPTHER

## 2020-11-02 NOTE — PROGRESS NOTES
"Outpatient Psychiatry Follow-Up Visit (MD/NP)    11/2/2020     The patient location is: Home in [place], [place]  The chief complaint leading to consultation is: Psychosis and anxiety   Visit type: audiovisual    Face to Face time with patient: 30 minutes  40 minutes of total time spent on the encounter, which includes face to face time and non-face to face time preparing to see the patient (eg, review of tests), Obtaining and/or reviewing separately obtained history, Documenting clinical information in the electronic or other health record, Independently interpreting results (not separately reported) and communicating results to the patient/family/caregiver, or Care coordination (not separately reported).       Each patient to whom he or she provides medical services by telemedicine is:  (1) informed of the relationship between the physician and patient and the respective role of any other health care provider with respect to management of the patient; and (2) notified that he or she may decline to receive medical services by telemedicine and may withdraw from such care at any time.    Clinical Status of Patient:  Outpatient (Ambulatory)    Chief Complaint:  Lesia Lawson is a 25 y.o. male who presents today for follow-up of depression, anxiety and psychosis  Met with patient.      Interval History and Content of Current Session:  Interim Events/Subjective Report/Content of Current Session:   He reported doing well since his last visit. Patient described his mood as "great. I have been in a good mood. I just have problems with focus and concentration." He reported having problems with focus in school that caused him to drop the course. We discussed effective skills to decrease distractibility and increase focus and concentration. We discussed scheduling an evaluation for ADHD if he is interested. We discussed how Wellbutrin can be used as an off label medication to improve focus.   He denied hearing any voices " "since his last visit. He is still taking vraylar to 3 mg po daily. Patient stated when he took Wellbutrin xl 150 mg it helped his depression but it increased his anxiety. He reported improved depression when taking half tab of Wellbutrin  mg po daily. Patient is aware that he should not cut the Wellbutrin  XL in half because it is extended release and stated, "I know but when I break Wellbutrin  mg po daily in half, it works for me and I don't feel depressed. When I took Wellbutrin  I started feeling depressed again".  He stated that his anxiety is improving with his current medication regimen. He denied SI/HI/VH and no delusions or paranoia. He is no longer is working and is hoping to work as a . Patient is planning to start school next semester. Patient denied having blurred vision or any other side effects. Patient is still taking Remeron 30 mg po qhs. He reported good sleep and appetite. He denied any new medical/physical/medication at this time. He is stable.     Medication Trails  Abilify (was not effective enough)  Zyprexa (was too strong and it made me feel high, did not get rid of hallucination and increased the thoughts)  Risperdal (it made me feel high. It me feel like I am outside myself. It made me slow and it increased my thoughts."  Vistaril 25-50mg po qhs (was helpful but does not need it while taking Remeron)  Wellbutrin  mg po daily      Psychotherapy:  · Target symptoms: depression, anxiety , psychosis  · Why chosen therapy is appropriate versus another modality: relevant to diagnosis, patient responds to this modality, evidence based practice  · Outcome monitoring methods: self-report, observation, feedback from family  · Therapeutic intervention type: insight oriented psychotherapy, behavior modifying psychotherapy, supportive psychotherapy  · Topics discussed/themes: building skills sets for symptom management, symptom recognition  · The patient's response to " "the intervention is motivated. The patient's progress toward treatment goals is fair.   · Duration of intervention: 16 minutes.    Review of Systems   · PSYCHIATRIC: Pertinant items are noted in the narrative.  · CONSTITUTIONAL: No weight gain or loss.   · MUSCULOSKELETAL: No pain or stiffness of the joints.  · NEUROLOGIC: No weakness, sensory changes, seizures, confusion, memory loss, tremor or other abnormal movements.  · ENDOCRINE: No polydipsia or polyuria.  · INTEGUMENTARY: No rashes or lacerations.  · EYES: No exophthalmos, jaundice or blindness.  · ENT: No dizziness, tinnitus or hearing loss.  · RESPIRATORY: No shortness of breath.  · CARDIOVASCULAR: No tachycardia or chest pain.  · GASTROINTESTINAL: No nausea, vomiting, pain, constipation or diarrhea.  · GENITOURINARY: No frequency, dysuria or sexual dysfunction.  · HEMATOLOGIC/LYMPHATIC: No excessive bleeding, prolonged or excessive bleeding after dental extraction/injury.  · ALLERGIC/IMMUNOLOGIC: No allergic response to materials, foods or animals at this time.    Past Medical, Family and Social History: The patient's past medical, family and social history have been reviewed and updated as appropriate within the electronic medical record - see encounter notes.    Compliance: yes    Side effects: None    Risk Parameters:  Patient reports no suicidal ideation  Patient reports no homicidal ideation  Patient reports no self-injurious behavior  Patient reports no violent behavior    Exam (detailed: at least 9 elements; comprehensive: all 15 elements)   Constitutional  Vitals:  Most recent vital signs, dated greater than 90 days prior to this appointment, were reviewed.   There were no vitals filed for this visit.     General:  unremarkable, age appropriate     Musculoskeletal  Muscle Strength/Tone:  no dystonia, no tremor, no tic   Gait & Station:  non-ataxic     Psychiatric  Speech:  no latency; no press   Mood & Affect:  "great"  congruent and appropriate "   Thought Process:  normal and logical   Associations:  intact   Thought Content:  normal, no suicidality, no homicidality, delusions, or paranoia   Insight:  intact, has awareness of illness   Judgement: behavior is adequate to circumstances   Orientation:  grossly intact   Memory: intact for content of interview   Language: grossly intact   Attention Span & Concentration:  able to focus   Fund of Knowledge:  intact and appropriate to age and level of education     Assessment and Diagnosis   Status/Progress: Based on the examination today, the patient's problem(s) is/are improved.  New problems have not been presented today.   Co-morbidities, Diagnostic uncertainty and Lack of compliance are not complicating management of the primary condition.  There are no active rule-out diagnoses for this patient at this time.     General Impression: Stable despite returning of auditory hallucinations. We will continue to monitor closely        ICD-10-CM ICD-9-CM   1. Schizophreniform psychosis, depressive type  F25.1 295.70     F20.3 295.90   2. ANGELIC (generalized anxiety disorder)  F41.1 300.02   3. MDD (major depressive disorder), recurrent episode, moderate  F33.1 296.32     v  Intervention/Counseling/Treatment Plan     · Medication Management:   ? Discontinue Zyprexa to 5 mg po qam and 15 mg po qhs (not effective)  ? Continue vraylar to 3 mg po daily for psychosis and increased auditory hallucinations. Per patient he tried vrayler 4.5 mg and it increased voices  ? Discontinue vistaril 25 mg po prn and 50 mg po qhs (does not need it)  ? Continue Remeron 30 mg po qhs for depression, anxiety and sleep  ? Continue Wellbutrin  mg po daily. He is not taking Wellbutrin  mg po daily for depressed mood (per patient, he felt depressed on Wellbutrin  mg daily and felt better with taking half tab of Wellbutrin  mg po daily.  · Labs: reviewed most recent labs. New labs due 3/2021  · AIMS due in 3/2021  · The  treatment plan and follow up plan were reviewed with the patient.  · Discussed with patient informed consent, risks vs. benefits, alternative treatments, side effect profile and the inherent unpredictability of individual responses to these treatments. The patient expresses understanding of the above and displays the capacity to agree with this current plan and had no other questions.  · Encouraged Patient to keep future appointments.   · Take medications as prescribed and abstain from substance abuse.   · In the event of an emergency patient was advised to go to the emergency room.  · Referral for further treatment to social work team for psychotherapy    Return to Clinic: 1 month or earlier as needed    Rosamaria Garvin, ROBERTP-BC

## 2020-11-02 NOTE — PATIENT INSTRUCTIONS
Bupropion extended-release tablets (Depression/Mood Disorders)  What is this medicine?  BUPROPION (byoo PROE pee on) is used to treat depression.  How should I use this medicine?  Take this medicine by mouth with a glass of water. Follow the directions on the prescription label. You can take it with or without food. If it upsets your stomach, take it with food. Do not crush, chew, or cut these tablets. This medicine is taken once daily at the same time each day. Do not take your medicine more often than directed. Do not stop taking this medicine suddenly except upon the advice of your doctor. Stopping this medicine too quickly may cause serious side effects or your condition may worsen.  A special MedGuide will be given to you by the pharmacist with each prescription and refill. Be sure to read this information carefully each time.  Talk to your pediatrician regarding the use of this medicine in children. Special care may be needed.  What side effects may I notice from receiving this medicine?  Side effects that you should report to your doctor or health care professional as soon as possible:  · allergic reactions like skin rash, itching or hives, swelling of the face, lips, or tongue  · breathing problems  · changes in vision  · confusion  · fast or irregular heartbeat  · hallucinations  · increased blood pressure  · redness, blistering, peeling or loosening of the skin, including inside the mouth  · seizures  · suicidal thoughts or other mood changes  · unusually weak or tired  · vomiting  Side effects that usually do not require medical attention (report to your doctor or health care professional if they continue or are bothersome):  · change in sex drive or performance  · constipation  · headache  · loss of appetite  · nausea  · tremors  · weight loss  What may interact with this medicine?  Do not take this medicine with any of the following medications:  · linezolid  · MAOIs like Azilect, Carbex, Eldepryl,  Marplan, Nardil, and Parnate  · methylene blue (injected into a vein)  · other medicines that contain bupropion like Zyban  This medicine may also interact with the following medications:  · alcohol  · certain medicines for anxiety or sleep  · certain medicines for blood pressure like metoprolol, propranolol  · certain medicines for depression or psychotic disturbances  · certain medicines for HIV or AIDS like efavirenz, lopinavir, nelfinavir, ritonavir  · certain medicines for irregular heart beat like propafenone, flecainide  · certain medicines for Parkinson's disease like amantadine, levodopa  · certain medicines for seizures like carbamazepine, phenytoin, phenobarbital  · cimetidine  · clopidogrel  · cyclophosphamide  · furazolidone  · isoniazid  · nicotine  · orphenadrine  · procarbazine  · steroid medicines like prednisone or cortisone  · stimulant medicines for attention disorders, weight loss, or to stay awake  · tamoxifen  · theophylline  · thiotepa  · ticlopidine  · tramadol  · warfarin  What if I miss a dose?  If you miss a dose, skip the missed dose and take your next tablet at the regular time. Do not take double or extra doses.  Where should I keep my medicine?  Keep out of the reach of children.  Store at room temperature between 15 and 30 degrees C (59 and 86 degrees F). Throw away any unused medicine after the expiration date.  What should I tell my health care provider before I take this medicine?  They need to know if you have any of these conditions:  · an eating disorder, such as anorexia or bulimia  · bipolar disorder or psychosis  · diabetes or high blood sugar, treated with medication  · glaucoma  · head injury or brain tumor  · heart disease, previous heart attack, or irregular heart beat  · high blood pressure  · kidney or liver disease  · seizures (convulsions)  · suicidal thoughts or a previous suicide attempt  · Tourette's syndrome  · weight loss  · an unusual or allergic reaction to  bupropion, other medicines, foods, dyes, or preservatives  · breast-feeding  · pregnant or trying to become pregnant  What should I watch for while using this medicine?  Tell your doctor if your symptoms do not get better or if they get worse. Visit your doctor or health care professional for regular checks on your progress. Because it may take several weeks to see the full effects of this medicine, it is important to continue your treatment as prescribed by your doctor.  Patients and their families should watch out for new or worsening thoughts of suicide or depression. Also watch out for sudden changes in feelings such as feeling anxious, agitated, panicky, irritable, hostile, aggressive, impulsive, severely restless, overly excited and hyperactive, or not being able to sleep. If this happens, especially at the beginning of treatment or after a change in dose, call your health care professional.  Avoid alcoholic drinks while taking this medicine. Drinking large amounts of alcoholic beverages, using sleeping or anxiety medicines, or quickly stopping the use of these agents while taking this medicine may increase your risk for a seizure.  Do not drive or use heavy machinery until you know how this medicine affects you. This medicine can impair your ability to perform these tasks.  Do not take this medicine close to bedtime. It may prevent you from sleeping.  Your mouth may get dry. Chewing sugarless gum or sucking hard candy, and drinking plenty of water may help. Contact your doctor if the problem does not go away or is severe.  The tablet shell for some brands of this medicine does not dissolve. This is normal. The tablet shell may appear whole in the stool. This is not a cause for concern.  NOTE:This sheet is a summary. It may not cover all possible information. If you have questions about this medicine, talk to your doctor, pharmacist, or health care provider. Copyright© 2017 [employer]        Cariprazine  oral capsules  What is this medicine?  CARIPRAZINE (car i PRA zeen) is an antipsychotic. It is used to treat schizophrenia or bipolar disorder. Bipolar disorder is also known as manic-depression.  How should I use this medicine?  Take this medicine by mouth with a glass of water. Follow the directions on the prescription label. You may take it with or without food. Take your medicine at regular intervals. Do not take it more often than directed. Do not stop taking except on your doctor's advice.  Talk to your pediatrician regarding the use of this medicine in children. Special care may be needed.  What side effects may I notice from receiving this medicine?  Side effects that you should report to your doctor or health care professional as soon as possible:  · allergic reactions like skin rash, itching or hives, swelling of the face, lips, or tongue  · changes in emotions or moods  · confusion  · difficulty swallowing  · feeling faint or lightheaded, falls  · fever or chills, sore throat  · inability to control muscle movements in the face, mouth, hands, arms, or legs  · increased hunger or thirst  · increased urination  · missed or irregular menstrual periods  · problems with balance, talking, walking  · seizures  · stiff muscles  · suicidal thoughts or other mood changes  · unusually weak or tired  Side effects that usually do not require medical attention (Report these to your doctor or health care professional if they continue or are bothersome.):  · constipation  · dizziness  · drowsiness  · nausea, vomiting  · restlessness  · upset stomach  · weight gain  What may interact with this medicine?  Do not take this medicine with any of the following medications:  · metoclopramide  This medicine may also interact with the following medications:  · carbamazepine  · certain medicines for depression, anxiety, or psychotic disturbances  · certain medicines for sleep  · itraconazole  · ketoconazole  · medicines for blood  pressure  · rifampin  · Sg's wort  What if I miss a dose?  If you miss a dose, take it as soon as you can. If it is almost time for your next dose, take only that dose. Do not take double or extra doses.  Where should I keep my medicine?  Keep out of the reach of children.  Store at room temperature between 15 and 30 degrees C (59 and 86 degrees F). Protect from light. Throw away any unused medicine after the expiration date.  What should I tell my health care provider before I take this medicine?  They need to know if you have any of these conditions:  · dementia  · diabetes  · difficulty swallowing  · heart disease  · history of breast cancer  · kidney disease  · liver disease  · low blood counts, like low white cell, platelet, or red cell counts  · low blood pressure  · Parkinson's disease  · seizures  · suicidal thoughts, plans, or attempt; a previous suicide attempt by you or a family member  · an unusual or allergic reaction to cariprazine, other medicines, foods, dyes, or preservatives  · pregnant or trying to get pregnant  · breast-feeding  What should I watch for while using this medicine?  Visit your doctor or health care professional for regular checks on your progress. It may be several weeks before you see the full effects of this medicine. Notify your doctor or health care professional if your symptoms get worse, if you have new symptoms, if you are having an unusual effect from this medicine, or if you feel out of control, very discouraged or think you might harm yourself or others.  Do not suddenly stop taking this medicine. You may need to gradually reduce the dose. Ask your doctor or health care professional for advice.  You may get dizzy or drowsy. Do not drive, use machinery, or do anything that needs mental alertness until you know how this medicine affects you. Do not stand or sit up quickly, especially if you are an older patient. This reduces the risk of dizzy or fainting spells. Alcohol  can increase dizziness and drowsiness. Avoid alcoholic drinks.  This medicine may cause dry eyes and blurred vision. If you wear contact lenses you may feel some discomfort. Lubricating drops may help. See your eye doctor if the problem does not go away or is severe.  This medicine can reduce the response of your body to heat or cold. Dress warm in cold weather and stay hydrated in hot weather. If possible, avoid extreme temperatures like saunas, hot tubs, very hot or cold showers, or activities that can cause dehydration such as vigorous exercise.  Women should inform their doctor if they wish to become pregnant or think they might be pregnant. The effects of this medicine on an unborn child are not known. A registry is available to monitor pregnancy outcomes in pregnant women exposed to this medicine or similar medicines. Talk to your health care professional or pharmacist for more information.  NOTE:This sheet is a summary. It may not cover all possible information. If you have questions about this medicine, talk to your doctor, pharmacist, or health care provider. Copyright© 2017 [employer]        Mirtazapine tablets  What is this medicine?  MIRTAZAPINE (melisa FAB a peen) is used to treat depression.  How should I use this medicine?  Take this medicine by mouth with a glass of water. Follow the directions on the prescription label. Take your medicine at regular intervals. Do not take your medicine more often than directed. Do not stop taking this medicine suddenly except upon the advice of your doctor. Stopping this medicine too quickly may cause serious side effects or your condition may worsen.  A special MedGuide will be given to you by the pharmacist with each prescription and refill. Be sure to read this information carefully each time.  Talk to your pediatrician regarding the use of this medicine in children. Special care may be needed.  What side effects may I notice from receiving this medicine?  Side  effects that you should report to your doctor or health care professional as soon as possible:  · allergic reactions like skin rash, itching or hives, swelling of the face, lips, or tongue  · breathing problems  · confusion  · fever, sore throat, or mouth ulcers or blisters  · flu like symptoms including fever, chills, cough, muscle or joint aches and pains  · stomach pain with nausea and/or vomiting  · suicidal thoughts or other mood changes  · swelling of the hands or feet  · unusual bleeding or bruising  · unusually weak or tired  · vomiting  Side effects that usually do not require medical attention (report to your doctor or health care professional if they continue or are bothersome):  · constipation  · increased appetite  · weight gain  What may interact with this medicine?  Do not take this medicine with any of the following medications:  · linezolid  · MAOIs like Carbex, Eldepryl, Marplan, Nardil, and Parnate  · methylene blue (injected into a vein)  This medicine may also interact with the following medications:  · alcohol  · antiviral medicines for HIV or AIDS  · certain medicines that treat or prevent blood clots like warfarin  · certain medicines for depression, anxiety, or psychotic disturbances  · certain medicines for fungal infections like ketoconazole and itraconazole  · certain medicines for migraine headache like almotriptan, eletriptan, frovatriptan, naratriptan, rizatriptan, sumatriptan, zolmitriptan  · certain medicines for seizures like carbamazepine or phenytoin  · certain medicines for sleep  · cimetidine  · erythromycin  · fentanyl  · lithium  · medicines for blood pressure  · nefazodone  · rasagiline  · rifampin  · supplements like Padilla's wort, kava kava, valerian  · tramadol  · tryptophan  What if I miss a dose?  If you miss a dose, take it as soon as you can. If it is almost time for your next dose, take only that dose. Do not take double or extra doses.  Where should I keep my  medicine?  Keep out of the reach of children.  Store at room temperature between 15 and 30 degrees C (59 and 86 degrees F) Protect from light and moisture. Throw away any unused medicine after the expiration date.  What should I tell my health care provider before I take this medicine?  They need to know if you have any of these conditions:  · bipolar disorder  · glaucoma  · kidney disease  · liver disease  · suicidal thoughts  · an unusual or allergic reaction to mirtazapine, other medicines, foods, dyes, or preservatives  · pregnant or trying to get pregnant  · breast-feeding  What should I watch for while using this medicine?  Tell your doctor if your symptoms do not get better or if they get worse. Visit your doctor or health care professional for regular checks on your progress. Because it may take several weeks to see the full effects of this medicine, it is important to continue your treatment as prescribed by your doctor.  Patients and their families should watch out for new or worsening thoughts of suicide or depression. Also watch out for sudden changes in feelings such as feeling anxious, agitated, panicky, irritable, hostile, aggressive, impulsive, severely restless, overly excited and hyperactive, or not being able to sleep. If this happens, especially at the beginning of treatment or after a change in dose, call your health care professional.  You may get drowsy or dizzy. Do not drive, use machinery, or do anything that needs mental alertness until you know how this medicine affects you. Do not stand or sit up quickly, especially if you are an older patient. This reduces the risk of dizzy or fainting spells. Alcohol may interfere with the effect of this medicine. Avoid alcoholic drinks.  This medicine may cause dry eyes and blurred vision. If you wear contact lenses you may feel some discomfort. Lubricating drops may help. See your eye doctor if the problem does not go away or is severe.  Your mouth  may get dry. Chewing sugarless gum or sucking hard candy, and drinking plenty of water may help. Contact your doctor if the problem does not go away or is severe.  NOTE:This sheet is a summary. It may not cover all possible information. If you have questions about this medicine, talk to your doctor, pharmacist, or health care provider. Copyright© 2017 [employer]

## 2020-12-23 ENCOUNTER — OFFICE VISIT (OUTPATIENT)
Dept: PSYCHIATRY | Facility: CLINIC | Age: 25
End: 2020-12-23
Payer: COMMERCIAL

## 2020-12-23 DIAGNOSIS — F20.3 UNDIFFERENTIATED SCHIZOPHRENIA: ICD-10-CM

## 2020-12-23 DIAGNOSIS — F33.1 MDD (MAJOR DEPRESSIVE DISORDER), RECURRENT EPISODE, MODERATE: ICD-10-CM

## 2020-12-23 DIAGNOSIS — F41.1 GAD (GENERALIZED ANXIETY DISORDER): Primary | ICD-10-CM

## 2020-12-23 DIAGNOSIS — R41.840 LACK OF CONCENTRATION: ICD-10-CM

## 2020-12-23 PROCEDURE — 99214 OFFICE O/P EST MOD 30 MIN: CPT | Mod: 95,,, | Performed by: NURSE PRACTITIONER

## 2020-12-23 PROCEDURE — 90833 PR PSYCHOTHERAPY W/PATIENT W/E&M, 30 MIN (ADD ON): ICD-10-PCS | Mod: 95,,, | Performed by: NURSE PRACTITIONER

## 2020-12-23 PROCEDURE — 99214 PR OFFICE/OUTPT VISIT, EST, LEVL IV, 30-39 MIN: ICD-10-PCS | Mod: 95,,, | Performed by: NURSE PRACTITIONER

## 2020-12-23 PROCEDURE — 90833 PSYTX W PT W E/M 30 MIN: CPT | Mod: 95,,, | Performed by: NURSE PRACTITIONER

## 2020-12-23 RX ORDER — MIRTAZAPINE 30 MG/1
30 TABLET, FILM COATED ORAL NIGHTLY
Qty: 90 TABLET | Refills: 0 | Status: SHIPPED | OUTPATIENT
Start: 2020-12-23 | End: 2021-01-20 | Stop reason: SDUPTHER

## 2020-12-23 RX ORDER — CARIPRAZINE 3 MG/1
3 CAPSULE, GELATIN COATED ORAL DAILY
Qty: 30 CAPSULE | Refills: 2 | Status: SHIPPED | OUTPATIENT
Start: 2020-12-23 | End: 2021-01-20 | Stop reason: SDUPTHER

## 2020-12-23 RX ORDER — BUPROPION HYDROCHLORIDE 150 MG/1
150 TABLET ORAL DAILY
Qty: 30 TABLET | Refills: 2 | Status: SHIPPED | OUTPATIENT
Start: 2020-12-23 | End: 2021-01-20 | Stop reason: SDUPTHER

## 2020-12-23 RX ORDER — BUSPIRONE HYDROCHLORIDE 5 MG/1
5 TABLET ORAL 2 TIMES DAILY
Qty: 60 TABLET | Refills: 1 | Status: SHIPPED | OUTPATIENT
Start: 2020-12-23 | End: 2021-01-19

## 2020-12-23 RX ORDER — BENZTROPINE MESYLATE 0.5 MG/1
0.5 TABLET ORAL 2 TIMES DAILY
Qty: 60 TABLET | Refills: 1 | Status: SHIPPED | OUTPATIENT
Start: 2020-12-23 | End: 2021-01-20 | Stop reason: SDUPTHER

## 2020-12-23 NOTE — PROGRESS NOTES
"Outpatient Psychiatry Follow-Up Visit (MD/NP)    12/23/2020     The patient location is: Home in [place], [place]  The chief complaint leading to consultation is: depression, anxiety and lack of focus  Visit type: audiovisual    Face to Face time with patient: 30 minutes  40 minutes of total time spent on the encounter, which includes face to face time and non-face to face time preparing to see the patient (eg, review of tests), Obtaining and/or reviewing separately obtained history, Documenting clinical information in the electronic or other health record, Independently interpreting results (not separately reported) and communicating results to the patient/family/caregiver, or Care coordination (not separately reported).       Each patient to whom he or she provides medical services by telemedicine is:  (1) informed of the relationship between the physician and patient and the respective role of any other health care provider with respect to management of the patient; and (2) notified that he or she may decline to receive medical services by telemedicine and may withdraw from such care at any time.    Clinical Status of Patient:  Outpatient (Ambulatory)    Chief Complaint:  Lesia Lawson is a 25 y.o. male who presents today for follow-up of depression, anxiety and psychosis  Met with patient.      Interval History and Content of Current Session:  Interim Events/Subjective Report/Content of Current Session:   He reported doing well since his last visit. Patient described his mood as "great. I have been taking the medication like I am supposed. I just have problems with focus and concentration. I cannot focus when I am reading." He reported medication compliance and denied side effects to his current medication regimen except for cogentin. He stated he is not taking cogentin daily because he thinks it decreases his urination. We discussed decreasing his cogentin dose to 0.5 mg po BID instead of 1 mg po BID. He denied " "feeling depressed or anxious.     He is still taking vraylar to 3 mg po daily. Patient stated when he took Wellbutrin xl 150 mg it helped his depression but it increased his anxiety. He reported improved depression when taking half tab of Wellbutrin  mg po daily. Patient is aware that he should not cut Wellbutrin  XL tab in half because it is extended release. When he was prescribed Wellbutrin  he didn't like it and stated he started to feel depressed again and it was not as effective.  Patient is still taking Remeron 30 mg po qhs. He reported good sleep and appetite. He denied any new medical/physical/medication at this time Patient stated. he moved back home two weeks ago and enjoys living at home. He plans to go back to school once his concentration gets better. He stated, "it is hard for me to concentrate and read." He stated he is having problems with comprehension as well. He denied hearing any voices since his last visit.  He stated that his anxiety is improving with his current medication regimen. He denied SI/HI/VH and no delusions or paranoia. He is no longer is working and is hoping to work as a . Patient is planning to start school next semester.           ADHD Adult:  Have difficulty sustaining attention in tasks or fun activities?  yes - "only reading"  Don't follow through on instructions and fail to finish work?  yes - "I do"  Have difficulty organizing tasks and activities? yes" I don't organize my tasks"  Avoid, dislike, or are reluctant to engage in work that requires sustained mental effort?  yes - "with reading. I avoid it because it is difficult to concentrate."  Easily distracted?  no  Forgetful in daily activities?  no  Fidget with hands or feet, or squirm in seat?  yes - "sometimes"  Have difficulty engaging in leisure activities or doing fun things quietly?  no  Feel "on the go" or "driven by a motor"?  yes - "sometimes, I pace the floor and walk down the " "hallway."  Blurt out answers before questions have been completed?  no  Have difficulty waiting your turn, are impatient?  yes - "I can be impatient sometimes."  Interrupt or intrude on others?  no    He stated he has been having problems with concentration since childhood but has been worsening. He stated his grades were not so good in school. He stated he had a hard time with concentration as a child because he was easily distracted by others. He stated, "I am not being able to read and comprehend things at this time." He reported his lack of focus in school caused him to drop the courses. We discussed effective skills to decrease distractibility and increase focus and concentration.  We discussed increasing Wellbutrin dose and how it can be used as an off label medication to improve focus. He declined because it increases his anxiety. We discussed Buspar as on off label to improved focus and he agreed to try it    Medication Trails  Abilify (was not effective enough)  Zyprexa (was too strong and it made me feel high, did not get rid of hallucination and increased the thoughts)  Risperdal (it made me feel high. It me feel like I am outside myself. It made me slow and it increased my thoughts."  Vistaril 25-50mg po qhs (was helpful but does not need it while taking Remeron)  Wellbutrin  mg po daily      Psychotherapy:  · Target symptoms: depression, anxiety , psychosis  · Why chosen therapy is appropriate versus another modality: relevant to diagnosis, patient responds to this modality, evidence based practice  · Outcome monitoring methods: self-report, observation, feedback from family  · Therapeutic intervention type: insight oriented psychotherapy, behavior modifying psychotherapy, supportive psychotherapy  · Topics discussed/themes: building skills sets for symptom management, symptom recognition  · The patient's response to the intervention is motivated. The patient's progress toward treatment goals is " "fair.   · Duration of intervention: 16 minutes.    Review of Systems   · PSYCHIATRIC: Pertinant items are noted in the narrative.  · CONSTITUTIONAL: No weight gain or loss.   · MUSCULOSKELETAL: No pain or stiffness of the joints.  · NEUROLOGIC: No weakness, sensory changes, seizures, confusion, memory loss, tremor or other abnormal movements.  · ENDOCRINE: No polydipsia or polyuria.  · INTEGUMENTARY: No rashes or lacerations.  · EYES: No exophthalmos, jaundice or blindness.  · ENT: No dizziness, tinnitus or hearing loss.  · RESPIRATORY: No shortness of breath.  · CARDIOVASCULAR: No tachycardia or chest pain.  · GASTROINTESTINAL: No nausea, vomiting, pain, constipation or diarrhea.  · GENITOURINARY: No frequency, dysuria or sexual dysfunction.  · HEMATOLOGIC/LYMPHATIC: No excessive bleeding, prolonged or excessive bleeding after dental extraction/injury.  · ALLERGIC/IMMUNOLOGIC: No allergic response to materials, foods or animals at this time.    Past Medical, Family and Social History: The patient's past medical, family and social history have been reviewed and updated as appropriate within the electronic medical record - see encounter notes.    Compliance: yes    Side effects: None    Risk Parameters:  Patient reports no suicidal ideation  Patient reports no homicidal ideation  Patient reports no self-injurious behavior  Patient reports no violent behavior    Exam (detailed: at least 9 elements; comprehensive: all 15 elements)   Constitutional  Vitals:  Most recent vital signs, dated greater than 90 days prior to this appointment, were reviewed.   There were no vitals filed for this visit.     General:  unremarkable, age appropriate     Musculoskeletal  Muscle Strength/Tone:  no dystonia, no tremor, no tic   Gait & Station:  non-ataxic     Psychiatric  Speech:  no latency; no press   Mood & Affect:  "great"  congruent and appropriate   Thought Process:  normal and logical   Associations:  intact   Thought Content:  " normal, no suicidality, no homicidality, delusions, or paranoia   Insight:  intact, has awareness of illness   Judgement: behavior is adequate to circumstances   Orientation:  grossly intact   Memory: intact for content of interview   Language: grossly intact   Attention Span & Concentration:  able to focus   Fund of Knowledge:  intact and appropriate to age and level of education     Assessment and Diagnosis   Status/Progress: Based on the examination today, the patient's problem(s) is/are improved.  New problems have not been presented today.   Co-morbidities, Diagnostic uncertainty and Lack of compliance are not complicating management of the primary condition.  There are no active rule-out diagnoses for this patient at this time.     General Impression: Stable despite returning of auditory hallucinations. We will continue to monitor closely. 12/24/20 presented with good mood and euthymic affect. We did evaluate for ADHD and patient seems to have problems with attention and comprehension but does not meet the full criteria for ADHD. We discussed treatment options that improve focus including off label medications like Wellbutrin and Buspar.         ICD-10-CM ICD-9-CM   1. 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 ANGELIC (generalized anxiety disorder)  F41.1 300.02   3. MDD (major depressive disorder), recurrent episode, moderate  F33.1 296.32   4. Lack of concentration  v  Intervention/Counseling/Treatment Plan     · Medication Management:   ? Discontinue Zyprexa to 5 mg po qam and 15 mg po qhs (not effective)  ? Continue vraylar to 3 mg po daily for psychosis and increased auditory hallucinations. Per patient he tried vrayler 4.5 mg and it increased voices  ? Discontinue vistaril 25 mg po prn and 50 mg po qhs (does not need it)  ? Continue Remeron 30 mg po qhs for depression, anxiety and sleep  ? Continue Wellbutrin  mg po daily. He is not taking Wellbutrin  mg po daily for  "depressed mood (per patient, he felt depressed on Wellbutrin  mg daily and felt better with taking half tab of Wellbutrin  mg po daily.  ? Decrease cogentin 0.5 mg po BID  ? Start Buspar 5 mg pot TID as an off label to improve concentration   Recommended book and resources for ADHD:"  [employer]:"Smart but Scattered: The Executive Skills to Keep Up, Stay Calm, and Get Organized at Work and at Home by Giovanna Dodd and Berry Lema. I have other options as well if you don't like this one.     For online resources try (e.g., [employer], https://www.eyesFinder.net/chapter/519)    More Resources:  -Developing and maintaining good organizational skills and stable routines   - Organizing Solutions for People with ADHD, 2nd Edition, Tips and Tools to Help You Take Charge of Your Life and Get Organized by Aline Miranda. The book is available on [employer].   - Book advised to help understand and manage adult ADHD: More Attention, Less Deficit: Success Strategies for Adults with ADHD by Vito Bernal   - Organizing Solutions for People with ADHD, 2nd Edition, Tips and Tools to Help You Take Charge of Your Life and Get Organized by Aline Miranda. The book is available on [employer].   -Discussed informed consent, diagnosis, risks and benefits of proposed treatment above vs alternative treatments vs no treatment, and potential side effects of these treatments. The patient expresses understanding of the above and displays the capacity to agree with this treatment given said understanding. Patient also agrees that, currently, the benefits outweigh the risks and would like to pursue treatment at this time. Answered all questions and discussed follow up. Encouraged patient to contact us with any questions or concerns.   -Encouraged Patient to keep future appointments.  -Take medications as prescribed and abstain from substance abuse.  -Pt to present to ED for thoughts to harm herself or others    · Labs: reviewed most recent labs. New labs due " 3/2021  · AIMS due in 3/2021  · The treatment plan and follow up plan were reviewed with the patient.  · Discussed with patient informed consent, risks vs. benefits, alternative treatments, side effect profile and the inherent unpredictability of individual responses to these treatments. The patient expresses understanding of the above and displays the capacity to agree with this current plan and had no other questions.  · Encouraged Patient to keep future appointments.   · Take medications as prescribed and abstain from substance abuse.   · In the event of an emergency patient was advised to go to the emergency room.  · Referral for further treatment to social work team for psychotherapy    Return to Clinic: 1 month or earlier as needed    Rosamaria Garvin, DOREEN-BC

## 2020-12-23 NOTE — PATIENT INSTRUCTIONS
Buspirone tablets  What is this medicine?  BUSPIRONE (bymino IRIS bach) is used to treat anxiety disorders.  How should I use this medicine?  Take this medicine by mouth with a glass of water. Follow the directions on the prescription label. You may take this medicine with or without food. To ensure that this medicine always works the same way for you, you should take it either always with or always without food. Take your doses at regular intervals. Do not take your medicine more often than directed. Do not stop taking except on the advice of your doctor or health care professional.  Talk to your pediatrician regarding the use of this medicine in children. Special care may be needed.  What side effects may I notice from receiving this medicine?  Side effects that you should report to your doctor or health care professional as soon as possible:  · blurred vision or other vision changes  · chest pain  · confusion  · difficulty breathing  · feelings of hostility or anger  · muscle aches and pains  · numbness or tingling in hands or feet  · ringing in the ears  · skin rash and itching  · vomiting  · weakness  Side effects that usually do not require medical attention (report to your doctor or health care professional if they continue or are bothersome):  · disturbed dreams, nightmares  · headache  · nausea  · restlessness or nervousness  · sore throat and nasal congestion  · stomach upset  What may interact with this medicine?  Do not take this medicine with any of the following medications:  · linezolid  · MAOIs like Carbex, Eldepryl, Marplan, Nardil, and Parnate  · methylene blue  · procarbazine  This medicine may also interact with the following medications:  · diazepam  · digoxin  · diltiazem  · erythromycin  · grapefruit juice  · haloperidol  · medicines for mental depression or mood problems  · medicines for seizures like carbamazepine, phenobarbital and phenytoin  · nefazodone  · other medications for  "anxiety  · rifampin  · ritonavir  · some antifungal medicines like itraconazole, ketoconazole, and voriconazole  · verapamil  · warfarin  What if I miss a dose?  If you miss a dose, take it as soon as you can. If it is almost time for your next dose, take only that dose. Do not take double or extra doses.  Where should I keep my medicine?  Keep out of the reach of children.  Store at room temperature below 30 degrees C (86 degrees F). Protect from light. Keep container tightly closed. Throw away any unused medicine after the expiration date.  What should I tell my health care provider before I take this medicine?  They need to know if you have any of these conditions:  · kidney or liver disease  · an unusual or allergic reaction to buspirone, other medicines, foods, dyes, or preservatives  · pregnant or trying to get pregnant  · breast-feeding  What should I watch for while using this medicine?  Visit your doctor or health care professional for regular checks on your progress. It may take 1 to 2 weeks before your anxiety gets better.  You may get drowsy or dizzy. Do not drive, use machinery, or do anything that needs mental alertness until you know how this drug affects you. Do not stand or sit up quickly, especially if you are an older patient. This reduces the risk of dizzy or fainting spells. Alcohol can make you more drowsy and dizzy. Avoid alcoholic drinks.  NOTE:This sheet is a summary. It may not cover all possible information. If you have questions about this medicine, talk to your doctor, pharmacist, or health care provider. Copyright© 2017 Gold Standard       Recommended book and resources for ADHD:"   Book:"Smart but Scattered: The Executive Skills to Keep Up, Stay Calm, and Get Organized at Work and at Home by Giovanna Dodd and Berry Lema.    For online resources try (e.g., EARLENE, https://www.earlene.net/chapter/519)    More Resources:  Mirtazapine tablets  What is this medicine?  MIRTAZAPINE (melisa FAB a " peen) is used to treat depression.  How should I use this medicine?  Take this medicine by mouth with a glass of water. Follow the directions on the prescription label. Take your medicine at regular intervals. Do not take your medicine more often than directed. Do not stop taking this medicine suddenly except upon the advice of your doctor. Stopping this medicine too quickly may cause serious side effects or your condition may worsen.  A special MedGuide will be given to you by the pharmacist with each prescription and refill. Be sure to read this information carefully each time.  Talk to your pediatrician regarding the use of this medicine in children. Special care may be needed.  What side effects may I notice from receiving this medicine?  Side effects that you should report to your doctor or health care professional as soon as possible:  · allergic reactions like skin rash, itching or hives, swelling of the face, lips, or tongue  · breathing problems  · confusion  · fever, sore throat, or mouth ulcers or blisters  · flu like symptoms including fever, chills, cough, muscle or joint aches and pains  · stomach pain with nausea and/or vomiting  · suicidal thoughts or other mood changes  · swelling of the hands or feet  · unusual bleeding or bruising  · unusually weak or tired  · vomiting  Side effects that usually do not require medical attention (report to your doctor or health care professional if they continue or are bothersome):  · constipation  · increased appetite  · weight gain  What may interact with this medicine?  Do not take this medicine with any of the following medications:  · linezolid  · MAOIs like Carbex, Eldepryl, Marplan, Nardil, and Parnate  · methylene blue (injected into a vein)  This medicine may also interact with the following medications:  · alcohol  · antiviral medicines for HIV or AIDS  · certain medicines that treat or prevent blood clots like warfarin  · certain medicines for  depression, anxiety, or psychotic disturbances  · certain medicines for fungal infections like ketoconazole and itraconazole  · certain medicines for migraine headache like almotriptan, eletriptan, frovatriptan, naratriptan, rizatriptan, sumatriptan, zolmitriptan  · certain medicines for seizures like carbamazepine or phenytoin  · certain medicines for sleep  · cimetidine  · erythromycin  · fentanyl  · lithium  · medicines for blood pressure  · nefazodone  · rasagiline  · rifampin  · supplements like Padilla's wort, kava kava, valerian  · tramadol  · tryptophan  What if I miss a dose?  If you miss a dose, take it as soon as you can. If it is almost time for your next dose, take only that dose. Do not take double or extra doses.  Where should I keep my medicine?  Keep out of the reach of children.  Store at room temperature between 15 and 30 degrees C (59 and 86 degrees F) Protect from light and moisture. Throw away any unused medicine after the expiration date.  What should I tell my health care provider before I take this medicine?  They need to know if you have any of these conditions:  · bipolar disorder  · glaucoma  · kidney disease  · liver disease  · suicidal thoughts  · an unusual or allergic reaction to mirtazapine, other medicines, foods, dyes, or preservatives  · pregnant or trying to get pregnant  · breast-feeding  What should I watch for while using this medicine?  Tell your doctor if your symptoms do not get better or if they get worse. Visit your doctor or health care professional for regular checks on your progress. Because it may take several weeks to see the full effects of this medicine, it is important to continue your treatment as prescribed by your doctor.  Patients and their families should watch out for new or worsening thoughts of suicide or depression. Also watch out for sudden changes in feelings such as feeling anxious, agitated, panicky, irritable, hostile, aggressive, impulsive,  severely restless, overly excited and hyperactive, or not being able to sleep. If this happens, especially at the beginning of treatment or after a change in dose, call your health care professional.  You may get drowsy or dizzy. Do not drive, use machinery, or do anything that needs mental alertness until you know how this medicine affects you. Do not stand or sit up quickly, especially if you are an older patient. This reduces the risk of dizzy or fainting spells. Alcohol may interfere with the effect of this medicine. Avoid alcoholic drinks.  This medicine may cause dry eyes and blurred vision. If you wear contact lenses you may feel some discomfort. Lubricating drops may help. See your eye doctor if the problem does not go away or is severe.  Your mouth may get dry. Chewing sugarless gum or sucking hard candy, and drinking plenty of water may help. Contact your doctor if the problem does not go away or is severe.  NOTE:This sheet is a summary. It may not cover all possible information. If you have questions about this medicine, talk to your doctor, pharmacist, or health care provider. Copyright© 2017 Gold Standard        Cariprazine oral capsules  What is this medicine?  CARIPRAZINE (car i PRA zeen) is an antipsychotic. It is used to treat schizophrenia or bipolar disorder. Bipolar disorder is also known as manic-depression.  How should I use this medicine?  Take this medicine by mouth with a glass of water. Follow the directions on the prescription label. You may take it with or without food. Take your medicine at regular intervals. Do not take it more often than directed. Do not stop taking except on your doctor's advice.  Talk to your pediatrician regarding the use of this medicine in children. Special care may be needed.  What side effects may I notice from receiving this medicine?  Side effects that you should report to your doctor or health care professional as soon as possible:  · allergic reactions like  skin rash, itching or hives, swelling of the face, lips, or tongue  · changes in emotions or moods  · confusion  · difficulty swallowing  · feeling faint or lightheaded, falls  · fever or chills, sore throat  · inability to control muscle movements in the face, mouth, hands, arms, or legs  · increased hunger or thirst  · increased urination  · missed or irregular menstrual periods  · problems with balance, talking, walking  · seizures  · stiff muscles  · suicidal thoughts or other mood changes  · unusually weak or tired  Side effects that usually do not require medical attention (Report these to your doctor or health care professional if they continue or are bothersome.):  · constipation  · dizziness  · drowsiness  · nausea, vomiting  · restlessness  · upset stomach  · weight gain  What may interact with this medicine?  Do not take this medicine with any of the following medications:  · metoclopramide  This medicine may also interact with the following medications:  · carbamazepine  · certain medicines for depression, anxiety, or psychotic disturbances  · certain medicines for sleep  · itraconazole  · ketoconazole  · medicines for blood pressure  · rifampin  · Sg's wort  What if I miss a dose?  If you miss a dose, take it as soon as you can. If it is almost time for your next dose, take only that dose. Do not take double or extra doses.  Where should I keep my medicine?  Keep out of the reach of children.  Store at room temperature between 15 and 30 degrees C (59 and 86 degrees F). Protect from light. Throw away any unused medicine after the expiration date.  What should I tell my health care provider before I take this medicine?  They need to know if you have any of these conditions:  · dementia  · diabetes  · difficulty swallowing  · heart disease  · history of breast cancer  · kidney disease  · liver disease  · low blood counts, like low white cell, platelet, or red cell counts  · low blood  pressure  · Parkinson's disease  · seizures  · suicidal thoughts, plans, or attempt; a previous suicide attempt by you or a family member  · an unusual or allergic reaction to cariprazine, other medicines, foods, dyes, or preservatives  · pregnant or trying to get pregnant  · breast-feeding  What should I watch for while using this medicine?  Visit your doctor or health care professional for regular checks on your progress. It may be several weeks before you see the full effects of this medicine. Notify your doctor or health care professional if your symptoms get worse, if you have new symptoms, if you are having an unusual effect from this medicine, or if you feel out of control, very discouraged or think you might harm yourself or others.  Do not suddenly stop taking this medicine. You may need to gradually reduce the dose. Ask your doctor or health care professional for advice.  You may get dizzy or drowsy. Do not drive, use machinery, or do anything that needs mental alertness until you know how this medicine affects you. Do not stand or sit up quickly, especially if you are an older patient. This reduces the risk of dizzy or fainting spells. Alcohol can increase dizziness and drowsiness. Avoid alcoholic drinks.  This medicine may cause dry eyes and blurred vision. If you wear contact lenses you may feel some discomfort. Lubricating drops may help. See your eye doctor if the problem does not go away or is severe.  This medicine can reduce the response of your body to heat or cold. Dress warm in cold weather and stay hydrated in hot weather. If possible, avoid extreme temperatures like saunas, hot tubs, very hot or cold showers, or activities that can cause dehydration such as vigorous exercise.  Women should inform their doctor if they wish to become pregnant or think they might be pregnant. The effects of this medicine on an unborn child are not known. A registry is available to monitor pregnancy outcomes in  pregnant women exposed to this medicine or similar medicines. Talk to your health care professional or pharmacist for more information.  NOTE:This sheet is a summary. It may not cover all possible information. If you have questions about this medicine, talk to your doctor, pharmacist, or health care provider. Copyright© 2017 Gold Standard        Bupropion extended-release tablets (Depression/Mood Disorders)  What is this medicine?  BUPROPION (byoo PROE pee on) is used to treat depression.  How should I use this medicine?  Take this medicine by mouth with a glass of water. Follow the directions on the prescription label. You can take it with or without food. If it upsets your stomach, take it with food. Do not crush, chew, or cut these tablets. This medicine is taken once daily at the same time each day. Do not take your medicine more often than directed. Do not stop taking this medicine suddenly except upon the advice of your doctor. Stopping this medicine too quickly may cause serious side effects or your condition may worsen.  A special MedGuide will be given to you by the pharmacist with each prescription and refill. Be sure to read this information carefully each time.  Talk to your pediatrician regarding the use of this medicine in children. Special care may be needed.  What side effects may I notice from receiving this medicine?  Side effects that you should report to your doctor or health care professional as soon as possible:  · allergic reactions like skin rash, itching or hives, swelling of the face, lips, or tongue  · breathing problems  · changes in vision  · confusion  · fast or irregular heartbeat  · hallucinations  · increased blood pressure  · redness, blistering, peeling or loosening of the skin, including inside the mouth  · seizures  · suicidal thoughts or other mood changes  · unusually weak or tired  · vomiting  Side effects that usually do not require medical attention (report to your doctor or  health care professional if they continue or are bothersome):  · change in sex drive or performance  · constipation  · headache  · loss of appetite  · nausea  · tremors  · weight loss  What may interact with this medicine?  Do not take this medicine with any of the following medications:  · linezolid  · MAOIs like Azilect, Carbex, Eldepryl, Marplan, Nardil, and Parnate  · methylene blue (injected into a vein)  · other medicines that contain bupropion like Zyban  This medicine may also interact with the following medications:  · alcohol  · certain medicines for anxiety or sleep  · certain medicines for blood pressure like metoprolol, propranolol  · certain medicines for depression or psychotic disturbances  · certain medicines for HIV or AIDS like efavirenz, lopinavir, nelfinavir, ritonavir  · certain medicines for irregular heart beat like propafenone, flecainide  · certain medicines for Parkinson's disease like amantadine, levodopa  · certain medicines for seizures like carbamazepine, phenytoin, phenobarbital  · cimetidine  · clopidogrel  · cyclophosphamide  · furazolidone  · isoniazid  · nicotine  · orphenadrine  · procarbazine  · steroid medicines like prednisone or cortisone  · stimulant medicines for attention disorders, weight loss, or to stay awake  · tamoxifen  · theophylline  · thiotepa  · ticlopidine  · tramadol  · warfarin  What if I miss a dose?  If you miss a dose, skip the missed dose and take your next tablet at the regular time. Do not take double or extra doses.  Where should I keep my medicine?  Keep out of the reach of children.  Store at room temperature between 15 and 30 degrees C (59 and 86 degrees F). Throw away any unused medicine after the expiration date.  What should I tell my health care provider before I take this medicine?  They need to know if you have any of these conditions:  · an eating disorder, such as anorexia or bulimia  · bipolar disorder or psychosis  · diabetes or high blood  sugar, treated with medication  · glaucoma  · head injury or brain tumor  · heart disease, previous heart attack, or irregular heart beat  · high blood pressure  · kidney or liver disease  · seizures (convulsions)  · suicidal thoughts or a previous suicide attempt  · Tourette's syndrome  · weight loss  · an unusual or allergic reaction to bupropion, other medicines, foods, dyes, or preservatives  · breast-feeding  · pregnant or trying to become pregnant  What should I watch for while using this medicine?  Tell your doctor if your symptoms do not get better or if they get worse. Visit your doctor or health care professional for regular checks on your progress. Because it may take several weeks to see the full effects of this medicine, it is important to continue your treatment as prescribed by your doctor.  Patients and their families should watch out for new or worsening thoughts of suicide or depression. Also watch out for sudden changes in feelings such as feeling anxious, agitated, panicky, irritable, hostile, aggressive, impulsive, severely restless, overly excited and hyperactive, or not being able to sleep. If this happens, especially at the beginning of treatment or after a change in dose, call your health care professional.  Avoid alcoholic drinks while taking this medicine. Drinking large amounts of alcoholic beverages, using sleeping or anxiety medicines, or quickly stopping the use of these agents while taking this medicine may increase your risk for a seizure.  Do not drive or use heavy machinery until you know how this medicine affects you. This medicine can impair your ability to perform these tasks.  Do not take this medicine close to bedtime. It may prevent you from sleeping.  Your mouth may get dry. Chewing sugarless gum or sucking hard candy, and drinking plenty of water may help. Contact your doctor if the problem does not go away or is severe.  The tablet shell for some brands of this medicine  does not dissolve. This is normal. The tablet shell may appear whole in the stool. This is not a cause for concern.  NOTE:This sheet is a summary. It may not cover all possible information. If you have questions about this medicine, talk to your doctor, pharmacist, or health care provider. Copyright© 2017 Gold Standard        -Developing and maintaining good organizational skills and stable routines   - Organizing Solutions for People with ADHD, 2nd Edition, Tips and Tools to Help You Take Charge of Your Life and Get Organized by Aline Miranda. The book is available on [employer].   - Book advised to help understand and manage adult ADHD: More Attention, Less Deficit: Success Strategies for Adults with ADHD by Vito Bernal   - Organizing Solutions for People with ADHD, 2nd Edition, Tips and Tools to Help You Take Charge of Your Life and Get Organized by Aline Miranda. The book is available on [employer].

## 2021-01-20 ENCOUNTER — OFFICE VISIT (OUTPATIENT)
Dept: PSYCHIATRY | Facility: CLINIC | Age: 26
End: 2021-01-20
Payer: COMMERCIAL

## 2021-01-20 DIAGNOSIS — F41.1 GAD (GENERALIZED ANXIETY DISORDER): ICD-10-CM

## 2021-01-20 DIAGNOSIS — F20.81 SCHIZOPHRENIFORM DISORDER: Primary | ICD-10-CM

## 2021-01-20 DIAGNOSIS — F33.1 MDD (MAJOR DEPRESSIVE DISORDER), RECURRENT EPISODE, MODERATE: ICD-10-CM

## 2021-01-20 PROCEDURE — 99214 OFFICE O/P EST MOD 30 MIN: CPT | Mod: 95,,, | Performed by: NURSE PRACTITIONER

## 2021-01-20 PROCEDURE — 99214 PR OFFICE/OUTPT VISIT, EST, LEVL IV, 30-39 MIN: ICD-10-PCS | Mod: 95,,, | Performed by: NURSE PRACTITIONER

## 2021-01-20 RX ORDER — BUSPIRONE HYDROCHLORIDE 5 MG/1
5 TABLET ORAL 2 TIMES DAILY
Qty: 60 TABLET | Refills: 1 | Status: SHIPPED | OUTPATIENT
Start: 2021-01-20 | End: 2021-01-26 | Stop reason: SDUPTHER

## 2021-01-20 RX ORDER — BENZTROPINE MESYLATE 0.5 MG/1
0.5 TABLET ORAL 2 TIMES DAILY
Qty: 60 TABLET | Refills: 1 | Status: SHIPPED | OUTPATIENT
Start: 2021-01-20 | End: 2021-01-26 | Stop reason: SDUPTHER

## 2021-01-20 RX ORDER — MIRTAZAPINE 30 MG/1
30 TABLET, FILM COATED ORAL NIGHTLY
Qty: 90 TABLET | Refills: 0 | Status: SHIPPED | OUTPATIENT
Start: 2021-01-20 | End: 2021-01-26 | Stop reason: SDUPTHER

## 2021-01-20 RX ORDER — CARIPRAZINE 3 MG/1
3 CAPSULE, GELATIN COATED ORAL DAILY
Qty: 30 CAPSULE | Refills: 2 | Status: SHIPPED | OUTPATIENT
Start: 2021-01-20 | End: 2021-01-26 | Stop reason: SDUPTHER

## 2021-01-20 RX ORDER — BUPROPION HYDROCHLORIDE 150 MG/1
150 TABLET ORAL DAILY
Qty: 30 TABLET | Refills: 2 | Status: SHIPPED | OUTPATIENT
Start: 2021-01-20 | End: 2021-01-26 | Stop reason: SDUPTHER

## 2021-01-21 ENCOUNTER — PATIENT MESSAGE (OUTPATIENT)
Dept: PSYCHIATRY | Facility: CLINIC | Age: 26
End: 2021-01-21

## 2021-01-21 NOTE — ED NOTES
"The patient presented to the ED @ 0107 via w/c & escorted by the ED tech. He is attired in [facility] scrubs w/ fair grooming & hygiene. He was checked for contraband, none found. His affect is pleasant, mood is congruent. He presents on a PEC written by BLAISE Lindsey on 03//04/2020 @ 7695. 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 He states that the devil devised this because of his alisha as it wouldn't benefit the devil to do this to a "non believer because they're already doomed." He states that after his mother returned to the ED after moving her car he felt homicidal towards her & told her to "leave now." He states that he never wants to hurt his mother. He appears very forthcoming. He denies SI, HI, A/VH. He was explained the plan of care & acknowledged understanding. He was informed that he will see a psychiatrist later during the day to explore any possible mental illness. He states that he was well before this happened. He states that he is open to the fact that he may be diagnosed w/ mental illness but it would be as a result of the devil's devices & only as a result of this occurrence. He is placed on direct visual observation. He presented to the [facility] w/ one belongings bag. He accepted a sandwich & opened his bible to begin reading some scriptures.  " Detail Level: Simple

## 2021-01-26 RX ORDER — BUPROPION HYDROCHLORIDE 150 MG/1
150 TABLET ORAL DAILY
Qty: 90 TABLET | Refills: 1 | Status: SHIPPED | OUTPATIENT
Start: 2021-01-26 | End: 2021-07-21 | Stop reason: SDUPTHER

## 2021-01-26 RX ORDER — BENZTROPINE MESYLATE 0.5 MG/1
0.5 TABLET ORAL 2 TIMES DAILY
Qty: 180 TABLET | Refills: 1 | Status: SHIPPED | OUTPATIENT
Start: 2021-01-26 | End: 2021-02-25

## 2021-01-26 RX ORDER — CARIPRAZINE 3 MG/1
3 CAPSULE, GELATIN COATED ORAL DAILY
Qty: 90 CAPSULE | Refills: 0 | Status: SHIPPED | OUTPATIENT
Start: 2021-01-26 | End: 2021-04-26

## 2021-01-26 RX ORDER — MIRTAZAPINE 30 MG/1
30 TABLET, FILM COATED ORAL NIGHTLY
Qty: 90 TABLET | Refills: 0 | Status: SHIPPED | OUTPATIENT
Start: 2021-01-26 | End: 2021-06-10

## 2021-01-26 RX ORDER — BUSPIRONE HYDROCHLORIDE 5 MG/1
5 TABLET ORAL 2 TIMES DAILY
Qty: 180 TABLET | Refills: 1 | Status: SHIPPED | OUTPATIENT
Start: 2021-01-26 | End: 2021-07-21 | Stop reason: SDUPTHER

## 2021-02-01 ENCOUNTER — OFFICE VISIT (OUTPATIENT)
Dept: PSYCHIATRY | Facility: CLINIC | Age: 26
End: 2021-02-01
Payer: COMMERCIAL

## 2021-02-01 VITALS
HEART RATE: 105 BPM | BODY MASS INDEX: 36.31 KG/M2 | SYSTOLIC BLOOD PRESSURE: 135 MMHG | DIASTOLIC BLOOD PRESSURE: 73 MMHG | WEIGHT: 267.75 LBS

## 2021-02-01 DIAGNOSIS — F33.1 MDD (MAJOR DEPRESSIVE DISORDER), RECURRENT EPISODE, MODERATE: ICD-10-CM

## 2021-02-01 DIAGNOSIS — F41.1 GAD (GENERALIZED ANXIETY DISORDER): ICD-10-CM

## 2021-02-01 DIAGNOSIS — F20.3 UNDIFFERENTIATED SCHIZOPHRENIA: Primary | ICD-10-CM

## 2021-02-01 PROCEDURE — 99999 PR PBB SHADOW E&M-EST. PATIENT-LVL II: CPT | Mod: PBBFAC,,, | Performed by: PSYCHIATRY & NEUROLOGY

## 2021-02-01 PROCEDURE — 99214 OFFICE O/P EST MOD 30 MIN: CPT | Mod: S$GLB,,, | Performed by: PSYCHIATRY & NEUROLOGY

## 2021-02-01 PROCEDURE — 99214 PR OFFICE/OUTPT VISIT, EST, LEVL IV, 30-39 MIN: ICD-10-PCS | Mod: S$GLB,,, | Performed by: PSYCHIATRY & NEUROLOGY

## 2021-02-01 PROCEDURE — 90833 PR PSYCHOTHERAPY W/PATIENT W/E&M, 30 MIN (ADD ON): ICD-10-PCS | Mod: S$GLB,,, | Performed by: PSYCHIATRY & NEUROLOGY

## 2021-02-01 PROCEDURE — 90833 PSYTX W PT W E/M 30 MIN: CPT | Mod: S$GLB,,, | Performed by: PSYCHIATRY & NEUROLOGY

## 2021-02-01 PROCEDURE — 99999 PR PBB SHADOW E&M-EST. PATIENT-LVL II: ICD-10-PCS | Mod: PBBFAC,,, | Performed by: PSYCHIATRY & NEUROLOGY

## 2021-04-14 ENCOUNTER — OFFICE VISIT (OUTPATIENT)
Dept: PSYCHIATRY | Facility: CLINIC | Age: 26
End: 2021-04-14
Payer: COMMERCIAL

## 2021-04-14 DIAGNOSIS — Z79.899 MEDICATION MANAGEMENT: Primary | ICD-10-CM

## 2021-04-14 DIAGNOSIS — F20.3 UNDIFFERENTIATED SCHIZOPHRENIA: ICD-10-CM

## 2021-04-14 DIAGNOSIS — F41.1 GAD (GENERALIZED ANXIETY DISORDER): ICD-10-CM

## 2021-04-14 PROCEDURE — 99214 OFFICE O/P EST MOD 30 MIN: CPT | Mod: 95,,, | Performed by: NURSE PRACTITIONER

## 2021-04-14 PROCEDURE — 99214 PR OFFICE/OUTPT VISIT, EST, LEVL IV, 30-39 MIN: ICD-10-PCS | Mod: 95,,, | Performed by: NURSE PRACTITIONER

## 2021-04-16 ENCOUNTER — PATIENT MESSAGE (OUTPATIENT)
Dept: RESEARCH | Facility: HOSPITAL | Age: 26
End: 2021-04-16

## 2021-06-12 ENCOUNTER — HOSPITAL ENCOUNTER (EMERGENCY)
Facility: HOSPITAL | Age: 26
Discharge: HOME | End: 2021-06-12
Attending: EMERGENCY MEDICINE
Payer: COMMERCIAL

## 2021-06-12 VITALS
BODY MASS INDEX: 34.52 KG/M2 | RESPIRATION RATE: 20 BRPM | SYSTOLIC BLOOD PRESSURE: 156 MMHG | HEIGHT: 74 IN | OXYGEN SATURATION: 97 % | WEIGHT: 268.94 LBS | TEMPERATURE: 99 F | DIASTOLIC BLOOD PRESSURE: 71 MMHG | HEART RATE: 94 BPM

## 2021-06-12 DIAGNOSIS — R06.02 SHORTNESS OF BREATH: ICD-10-CM

## 2021-06-12 PROCEDURE — 99282 EMERGENCY DEPT VISIT SF MDM: CPT | Mod: 25

## 2021-06-12 RX ORDER — CARIPRAZINE 3 MG/1
3 CAPSULE, GELATIN COATED ORAL
COMMUNITY
End: 2021-06-15

## 2021-06-30 ENCOUNTER — PATIENT MESSAGE (OUTPATIENT)
Dept: PSYCHIATRY | Facility: CLINIC | Age: 26
End: 2021-06-30

## 2021-07-06 ENCOUNTER — PATIENT MESSAGE (OUTPATIENT)
Dept: ADMINISTRATIVE | Facility: HOSPITAL | Age: 26
End: 2021-07-06

## 2021-07-21 ENCOUNTER — OFFICE VISIT (OUTPATIENT)
Dept: PSYCHIATRY | Facility: CLINIC | Age: 26
End: 2021-07-21
Payer: COMMERCIAL

## 2021-07-21 DIAGNOSIS — F41.1 GAD (GENERALIZED ANXIETY DISORDER): Primary | ICD-10-CM

## 2021-07-21 DIAGNOSIS — F20.3 UNDIFFERENTIATED SCHIZOPHRENIA: ICD-10-CM

## 2021-07-21 DIAGNOSIS — F33.1 MDD (MAJOR DEPRESSIVE DISORDER), RECURRENT EPISODE, MODERATE: ICD-10-CM

## 2021-07-21 PROCEDURE — 99214 OFFICE O/P EST MOD 30 MIN: CPT | Mod: 95,,, | Performed by: NURSE PRACTITIONER

## 2021-07-21 PROCEDURE — 99214 PR OFFICE/OUTPT VISIT, EST, LEVL IV, 30-39 MIN: ICD-10-PCS | Mod: 95,,, | Performed by: NURSE PRACTITIONER

## 2021-07-21 RX ORDER — BUSPIRONE HYDROCHLORIDE 5 MG/1
5 TABLET ORAL 2 TIMES DAILY
Qty: 180 TABLET | Refills: 1 | Status: SHIPPED | OUTPATIENT
Start: 2021-07-21 | End: 2021-11-11 | Stop reason: SDUPTHER

## 2021-07-21 RX ORDER — MIRTAZAPINE 30 MG/1
30 TABLET, FILM COATED ORAL NIGHTLY
Qty: 90 TABLET | Refills: 1 | Status: SHIPPED | OUTPATIENT
Start: 2021-07-21 | End: 2021-11-11 | Stop reason: SDUPTHER

## 2021-07-21 RX ORDER — CARIPRAZINE 3 MG/1
3 CAPSULE, GELATIN COATED ORAL DAILY
Qty: 30 CAPSULE | Refills: 2 | Status: SHIPPED | OUTPATIENT
Start: 2021-07-21 | End: 2021-08-20

## 2021-07-21 RX ORDER — BENZTROPINE MESYLATE 1 MG/1
1 TABLET ORAL 2 TIMES DAILY
Qty: 180 TABLET | Refills: 0 | Status: SHIPPED | OUTPATIENT
Start: 2021-07-21 | End: 2021-11-11 | Stop reason: SDUPTHER

## 2021-07-21 RX ORDER — BUPROPION HYDROCHLORIDE 150 MG/1
150 TABLET ORAL DAILY
Qty: 90 TABLET | Refills: 1 | Status: SHIPPED | OUTPATIENT
Start: 2021-07-21 | End: 2021-11-11 | Stop reason: SDUPTHER

## 2021-10-04 ENCOUNTER — PATIENT MESSAGE (OUTPATIENT)
Dept: ADMINISTRATIVE | Facility: HOSPITAL | Age: 26
End: 2021-10-04

## 2021-11-04 RX ORDER — CARIPRAZINE 3 MG/1
3 CAPSULE, GELATIN COATED ORAL DAILY
Qty: 30 CAPSULE | Refills: 0 | Status: SHIPPED | OUTPATIENT
Start: 2021-11-04 | End: 2021-11-11 | Stop reason: SDUPTHER

## 2021-11-05 ENCOUNTER — PATIENT MESSAGE (OUTPATIENT)
Dept: PSYCHIATRY | Facility: CLINIC | Age: 26
End: 2021-11-05
Payer: COMMERCIAL

## 2021-11-11 DIAGNOSIS — F41.1 GAD (GENERALIZED ANXIETY DISORDER): ICD-10-CM

## 2021-11-11 DIAGNOSIS — F20.3 UNDIFFERENTIATED SCHIZOPHRENIA: ICD-10-CM

## 2021-11-11 DIAGNOSIS — F33.1 MDD (MAJOR DEPRESSIVE DISORDER), RECURRENT EPISODE, MODERATE: ICD-10-CM

## 2021-11-11 RX ORDER — CARIPRAZINE 3 MG/1
3 CAPSULE, GELATIN COATED ORAL DAILY
Qty: 30 CAPSULE | Refills: 0 | Status: SHIPPED | OUTPATIENT
Start: 2021-11-11 | End: 2021-11-24 | Stop reason: SDUPTHER

## 2021-11-11 RX ORDER — MIRTAZAPINE 30 MG/1
30 TABLET, FILM COATED ORAL NIGHTLY
Qty: 90 TABLET | Refills: 0 | Status: SHIPPED | OUTPATIENT
Start: 2021-11-11 | End: 2021-11-11 | Stop reason: SDUPTHER

## 2021-11-11 RX ORDER — BENZTROPINE MESYLATE 1 MG/1
1 TABLET ORAL 2 TIMES DAILY
Qty: 180 TABLET | Refills: 0 | Status: SHIPPED | OUTPATIENT
Start: 2021-11-11 | End: 2022-03-08 | Stop reason: SDUPTHER

## 2021-11-11 RX ORDER — MIRTAZAPINE 30 MG/1
30 TABLET, FILM COATED ORAL NIGHTLY
Qty: 90 TABLET | Refills: 0 | Status: SHIPPED | OUTPATIENT
Start: 2021-11-11 | End: 2022-03-08 | Stop reason: SDUPTHER

## 2021-11-11 RX ORDER — CARIPRAZINE 3 MG/1
3 CAPSULE, GELATIN COATED ORAL DAILY
Qty: 30 CAPSULE | Refills: 0 | Status: SHIPPED | OUTPATIENT
Start: 2021-11-11 | End: 2021-11-11 | Stop reason: SDUPTHER

## 2021-11-11 RX ORDER — BUPROPION HYDROCHLORIDE 150 MG/1
150 TABLET ORAL DAILY
Qty: 90 TABLET | Refills: 1 | Status: SHIPPED | OUTPATIENT
Start: 2021-11-11 | End: 2022-03-08 | Stop reason: SDUPTHER

## 2021-11-11 RX ORDER — BUSPIRONE HYDROCHLORIDE 5 MG/1
5 TABLET ORAL 2 TIMES DAILY
Qty: 180 TABLET | Refills: 1 | Status: SHIPPED | OUTPATIENT
Start: 2021-11-11 | End: 2022-03-08 | Stop reason: SDUPTHER

## 2021-11-12 ENCOUNTER — TELEPHONE (OUTPATIENT)
Dept: PHARMACY | Facility: CLINIC | Age: 26
End: 2021-11-12
Payer: COMMERCIAL

## 2021-11-24 RX ORDER — CARIPRAZINE 3 MG/1
3 CAPSULE, GELATIN COATED ORAL DAILY
Qty: 30 CAPSULE | Refills: 0 | Status: SHIPPED | OUTPATIENT
Start: 2021-11-24 | End: 2022-03-08 | Stop reason: SDUPTHER

## 2021-11-29 ENCOUNTER — TELEPHONE (OUTPATIENT)
Dept: PSYCHIATRY | Facility: CLINIC | Age: 26
End: 2021-11-29
Payer: COMMERCIAL

## 2021-12-10 RX ORDER — BENZTROPINE MESYLATE 0.5 MG/1
0.5 TABLET ORAL 2 TIMES DAILY
Qty: 60 TABLET | Refills: 0 | Status: SHIPPED | OUTPATIENT
Start: 2021-12-10 | End: 2022-01-09

## 2022-03-08 DIAGNOSIS — F33.1 MDD (MAJOR DEPRESSIVE DISORDER), RECURRENT EPISODE, MODERATE: ICD-10-CM

## 2022-03-08 DIAGNOSIS — F41.1 GAD (GENERALIZED ANXIETY DISORDER): ICD-10-CM

## 2022-03-08 DIAGNOSIS — F20.3 UNDIFFERENTIATED SCHIZOPHRENIA: ICD-10-CM

## 2022-03-08 RX ORDER — MIRTAZAPINE 30 MG/1
30 TABLET, FILM COATED ORAL NIGHTLY
Qty: 90 TABLET | Refills: 0 | Status: SHIPPED | OUTPATIENT
Start: 2022-03-08 | End: 2022-06-06

## 2022-03-08 RX ORDER — BENZTROPINE MESYLATE 1 MG/1
1 TABLET ORAL 2 TIMES DAILY
Qty: 60 TABLET | Refills: 0 | Status: SHIPPED | OUTPATIENT
Start: 2022-03-08 | End: 2022-03-08 | Stop reason: SDUPTHER

## 2022-03-08 RX ORDER — BENZTROPINE MESYLATE 1 MG/1
1 TABLET ORAL 2 TIMES DAILY
Qty: 60 TABLET | Refills: 0 | Status: SHIPPED | OUTPATIENT
Start: 2022-03-08 | End: 2022-04-07

## 2022-03-08 RX ORDER — CARIPRAZINE 3 MG/1
3 CAPSULE, GELATIN COATED ORAL DAILY
Qty: 30 CAPSULE | Refills: 0 | Status: SHIPPED | OUTPATIENT
Start: 2022-03-08 | End: 2022-04-12 | Stop reason: SDUPTHER

## 2022-03-08 RX ORDER — BUSPIRONE HYDROCHLORIDE 5 MG/1
5 TABLET ORAL 2 TIMES DAILY
Qty: 60 TABLET | Refills: 0 | Status: SHIPPED | OUTPATIENT
Start: 2022-03-08 | End: 2022-04-07

## 2022-03-08 RX ORDER — BUPROPION HYDROCHLORIDE 150 MG/1
150 TABLET ORAL DAILY
Qty: 30 TABLET | Refills: 0 | Status: SHIPPED | OUTPATIENT
Start: 2022-03-08 | End: 2022-03-08 | Stop reason: SDUPTHER

## 2022-03-08 RX ORDER — BUSPIRONE HYDROCHLORIDE 5 MG/1
5 TABLET ORAL 2 TIMES DAILY
Qty: 60 TABLET | Refills: 0 | Status: SHIPPED | OUTPATIENT
Start: 2022-03-08 | End: 2022-03-08 | Stop reason: SDUPTHER

## 2022-03-08 RX ORDER — BUPROPION HYDROCHLORIDE 150 MG/1
150 TABLET ORAL DAILY
Qty: 30 TABLET | Refills: 0 | Status: SHIPPED | OUTPATIENT
Start: 2022-03-08 | End: 2022-04-07

## 2022-03-08 RX ORDER — CARIPRAZINE 3 MG/1
3 CAPSULE, GELATIN COATED ORAL DAILY
Qty: 30 CAPSULE | Refills: 0 | Status: SHIPPED | OUTPATIENT
Start: 2022-03-08 | End: 2022-03-08 | Stop reason: SDUPTHER

## 2022-05-31 ENCOUNTER — PATIENT MESSAGE (OUTPATIENT)
Dept: ADMINISTRATIVE | Facility: HOSPITAL | Age: 27
End: 2022-05-31
Payer: COMMERCIAL